# Patient Record
Sex: FEMALE | Race: WHITE | Employment: OTHER | URBAN - METROPOLITAN AREA
[De-identification: names, ages, dates, MRNs, and addresses within clinical notes are randomized per-mention and may not be internally consistent; named-entity substitution may affect disease eponyms.]

---

## 2017-03-06 ENCOUNTER — APPOINTMENT (OUTPATIENT)
Dept: GENERAL RADIOLOGY | Age: 67
DRG: 948 | End: 2017-03-06
Attending: EMERGENCY MEDICINE
Payer: MEDICARE

## 2017-03-06 ENCOUNTER — HOSPITAL ENCOUNTER (INPATIENT)
Age: 67
LOS: 2 days | Discharge: HOME HEALTH | DRG: 948 | End: 2017-03-08
Attending: EMERGENCY MEDICINE | Admitting: FAMILY MEDICINE
Payer: MEDICARE

## 2017-03-06 DIAGNOSIS — R29.898 LEFT LEG WEAKNESS: Primary | ICD-10-CM

## 2017-03-06 DIAGNOSIS — R01.1 CARDIAC MURMUR: ICD-10-CM

## 2017-03-06 PROBLEM — R53.1 WEAKNESS GENERALIZED: Status: ACTIVE | Noted: 2017-03-06

## 2017-03-06 LAB
ALBUMIN SERPL BCP-MCNC: 4 G/DL (ref 3.2–4.6)
ALBUMIN/GLOB SERPL: 1 {RATIO} (ref 1.2–3.5)
ALP SERPL-CCNC: 107 U/L (ref 50–136)
ALT SERPL-CCNC: 39 U/L (ref 12–65)
ANION GAP BLD CALC-SCNC: 9 MMOL/L (ref 7–16)
AST SERPL W P-5'-P-CCNC: 32 U/L (ref 15–37)
BASOPHILS # BLD AUTO: 0 K/UL (ref 0–0.2)
BASOPHILS # BLD: 0 % (ref 0–2)
BILIRUB SERPL-MCNC: 0.4 MG/DL (ref 0.2–1.1)
BUN SERPL-MCNC: 21 MG/DL (ref 8–23)
CALCIUM SERPL-MCNC: 11.1 MG/DL (ref 8.3–10.4)
CHLORIDE SERPL-SCNC: 99 MMOL/L (ref 98–107)
CO2 SERPL-SCNC: 31 MMOL/L (ref 21–32)
CREAT SERPL-MCNC: 1.22 MG/DL (ref 0.6–1)
DIFFERENTIAL METHOD BLD: ABNORMAL
EOSINOPHIL # BLD: 0.2 K/UL (ref 0–0.8)
EOSINOPHIL NFR BLD: 3 % (ref 0.5–7.8)
ERYTHROCYTE [DISTWIDTH] IN BLOOD BY AUTOMATED COUNT: 12.7 % (ref 11.9–14.6)
GLOBULIN SER CALC-MCNC: 3.9 G/DL (ref 2.3–3.5)
GLUCOSE SERPL-MCNC: 149 MG/DL (ref 65–100)
HCT VFR BLD AUTO: 41.9 % (ref 35.8–46.3)
HGB BLD-MCNC: 14.7 G/DL (ref 11.7–15.4)
IMM GRANULOCYTES # BLD: 0 K/UL (ref 0–0.5)
IMM GRANULOCYTES NFR BLD AUTO: 0.3 % (ref 0–5)
LYMPHOCYTES # BLD AUTO: 33 % (ref 13–44)
LYMPHOCYTES # BLD: 2.4 K/UL (ref 0.5–4.6)
MCH RBC QN AUTO: 33.1 PG (ref 26.1–32.9)
MCHC RBC AUTO-ENTMCNC: 35.1 G/DL (ref 31.4–35)
MCV RBC AUTO: 94.4 FL (ref 79.6–97.8)
MONOCYTES # BLD: 0.4 K/UL (ref 0.1–1.3)
MONOCYTES NFR BLD AUTO: 5 % (ref 4–12)
NEUTS SEG # BLD: 4.2 K/UL (ref 1.7–8.2)
NEUTS SEG NFR BLD AUTO: 59 % (ref 43–78)
PLATELET # BLD AUTO: 243 K/UL (ref 150–450)
PMV BLD AUTO: 10.1 FL (ref 10.8–14.1)
POTASSIUM SERPL-SCNC: 3.7 MMOL/L (ref 3.5–5.1)
PROT SERPL-MCNC: 7.9 G/DL (ref 6.3–8.2)
RBC # BLD AUTO: 4.44 M/UL (ref 4.05–5.25)
SODIUM SERPL-SCNC: 139 MMOL/L (ref 136–145)
TROPONIN I SERPL-MCNC: <0.02 NG/ML (ref 0.02–0.05)
WBC # BLD AUTO: 7.3 K/UL (ref 4.3–11.1)

## 2017-03-06 PROCEDURE — 85025 COMPLETE CBC W/AUTO DIFF WBC: CPT | Performed by: EMERGENCY MEDICINE

## 2017-03-06 PROCEDURE — 71020 XR CHEST PA LAT: CPT

## 2017-03-06 PROCEDURE — 93005 ELECTROCARDIOGRAM TRACING: CPT | Performed by: EMERGENCY MEDICINE

## 2017-03-06 PROCEDURE — 99284 EMERGENCY DEPT VISIT MOD MDM: CPT | Performed by: EMERGENCY MEDICINE

## 2017-03-06 PROCEDURE — 65270000029 HC RM PRIVATE

## 2017-03-06 PROCEDURE — 84484 ASSAY OF TROPONIN QUANT: CPT | Performed by: EMERGENCY MEDICINE

## 2017-03-06 PROCEDURE — 80053 COMPREHEN METABOLIC PANEL: CPT | Performed by: EMERGENCY MEDICINE

## 2017-03-07 ENCOUNTER — APPOINTMENT (OUTPATIENT)
Dept: MRI IMAGING | Age: 67
DRG: 948 | End: 2017-03-07
Attending: FAMILY MEDICINE
Payer: MEDICARE

## 2017-03-07 ENCOUNTER — APPOINTMENT (OUTPATIENT)
Dept: ULTRASOUND IMAGING | Age: 67
DRG: 948 | End: 2017-03-07
Attending: INTERNAL MEDICINE
Payer: MEDICARE

## 2017-03-07 ENCOUNTER — APPOINTMENT (OUTPATIENT)
Dept: MRI IMAGING | Age: 67
DRG: 948 | End: 2017-03-07
Attending: INTERNAL MEDICINE
Payer: MEDICARE

## 2017-03-07 LAB
ANION GAP BLD CALC-SCNC: 8 MMOL/L (ref 7–16)
ATRIAL RATE: 101 BPM
BUN SERPL-MCNC: 24 MG/DL (ref 8–23)
CALCIUM SERPL-MCNC: 10 MG/DL (ref 8.3–10.4)
CALCULATED P AXIS, ECG09: 53 DEGREES
CALCULATED R AXIS, ECG10: 78 DEGREES
CALCULATED T AXIS, ECG11: 35 DEGREES
CHLORIDE SERPL-SCNC: 102 MMOL/L (ref 98–107)
CHOLEST SERPL-MCNC: 187 MG/DL
CO2 SERPL-SCNC: 29 MMOL/L (ref 21–32)
CREAT SERPL-MCNC: 1.07 MG/DL (ref 0.6–1)
DIAGNOSIS, 93000: NORMAL
DIASTOLIC BP, ECG02: NORMAL MMHG
ERYTHROCYTE [DISTWIDTH] IN BLOOD BY AUTOMATED COUNT: 12.8 % (ref 11.9–14.6)
EST. AVERAGE GLUCOSE BLD GHB EST-MCNC: 120 MG/DL
FLUAV AG NPH QL IA: NEGATIVE
FLUBV AG NPH QL IA: NEGATIVE
GLUCOSE BLD STRIP.AUTO-MCNC: 106 MG/DL (ref 65–100)
GLUCOSE BLD STRIP.AUTO-MCNC: 161 MG/DL (ref 65–100)
GLUCOSE BLD STRIP.AUTO-MCNC: 91 MG/DL (ref 65–100)
GLUCOSE SERPL-MCNC: 115 MG/DL (ref 65–100)
HBA1C MFR BLD: 5.8 % (ref 4.8–6)
HCT VFR BLD AUTO: 41.5 % (ref 35.8–46.3)
HDLC SERPL-MCNC: 44 MG/DL (ref 40–60)
HDLC SERPL: 4.3 {RATIO}
HGB BLD-MCNC: 14.5 G/DL (ref 11.7–15.4)
LDLC SERPL CALC-MCNC: 102.8 MG/DL
LIPID PROFILE,FLP: ABNORMAL
MAGNESIUM SERPL-MCNC: 2.4 MG/DL (ref 1.8–2.4)
MCH RBC QN AUTO: 33.1 PG (ref 26.1–32.9)
MCHC RBC AUTO-ENTMCNC: 34.9 G/DL (ref 31.4–35)
MCV RBC AUTO: 94.7 FL (ref 79.6–97.8)
P-R INTERVAL, ECG05: 160 MS
PLATELET # BLD AUTO: 163 K/UL (ref 150–450)
PMV BLD AUTO: 10.7 FL (ref 10.8–14.1)
POTASSIUM SERPL-SCNC: 3.8 MMOL/L (ref 3.5–5.1)
Q-T INTERVAL, ECG07: 342 MS
QRS DURATION, ECG06: 92 MS
QTC CALCULATION (BEZET), ECG08: 443 MS
RBC # BLD AUTO: 4.38 M/UL (ref 4.05–5.25)
SODIUM SERPL-SCNC: 139 MMOL/L (ref 136–145)
SYSTOLIC BP, ECG01: NORMAL MMHG
T4 FREE SERPL-MCNC: 1.1 NG/DL (ref 0.78–1.46)
TRIGL SERPL-MCNC: 201 MG/DL (ref 35–150)
TSH SERPL DL<=0.005 MIU/L-ACNC: 2.69 UIU/ML (ref 0.36–3.74)
VENTRICULAR RATE, ECG03: 101 BPM
VLDLC SERPL CALC-MCNC: 40.2 MG/DL (ref 6–23)
WBC # BLD AUTO: 5.7 K/UL (ref 4.3–11.1)

## 2017-03-07 PROCEDURE — 84439 ASSAY OF FREE THYROXINE: CPT | Performed by: FAMILY MEDICINE

## 2017-03-07 PROCEDURE — 86580 TB INTRADERMAL TEST: CPT | Performed by: FAMILY MEDICINE

## 2017-03-07 PROCEDURE — 70553 MRI BRAIN STEM W/O & W/DYE: CPT

## 2017-03-07 PROCEDURE — 83036 HEMOGLOBIN GLYCOSYLATED A1C: CPT | Performed by: FAMILY MEDICINE

## 2017-03-07 PROCEDURE — 80061 LIPID PANEL: CPT | Performed by: FAMILY MEDICINE

## 2017-03-07 PROCEDURE — 83735 ASSAY OF MAGNESIUM: CPT | Performed by: FAMILY MEDICINE

## 2017-03-07 PROCEDURE — 84443 ASSAY THYROID STIM HORMONE: CPT | Performed by: FAMILY MEDICINE

## 2017-03-07 PROCEDURE — 84481 FREE ASSAY (FT-3): CPT | Performed by: FAMILY MEDICINE

## 2017-03-07 PROCEDURE — 93880 EXTRACRANIAL BILAT STUDY: CPT

## 2017-03-07 PROCEDURE — 74011000302 HC RX REV CODE- 302: Performed by: FAMILY MEDICINE

## 2017-03-07 PROCEDURE — 74011250637 HC RX REV CODE- 250/637: Performed by: INTERNAL MEDICINE

## 2017-03-07 PROCEDURE — 80048 BASIC METABOLIC PNL TOTAL CA: CPT | Performed by: FAMILY MEDICINE

## 2017-03-07 PROCEDURE — 74011250636 HC RX REV CODE- 250/636: Performed by: FAMILY MEDICINE

## 2017-03-07 PROCEDURE — 74011250636 HC RX REV CODE- 250/636: Performed by: INTERNAL MEDICINE

## 2017-03-07 PROCEDURE — 65660000000 HC RM CCU STEPDOWN

## 2017-03-07 PROCEDURE — 82962 GLUCOSE BLOOD TEST: CPT

## 2017-03-07 PROCEDURE — 87804 INFLUENZA ASSAY W/OPTIC: CPT | Performed by: EMERGENCY MEDICINE

## 2017-03-07 PROCEDURE — 72141 MRI NECK SPINE W/O DYE: CPT

## 2017-03-07 PROCEDURE — 97162 PT EVAL MOD COMPLEX 30 MIN: CPT

## 2017-03-07 PROCEDURE — A9577 INJ MULTIHANCE: HCPCS | Performed by: FAMILY MEDICINE

## 2017-03-07 PROCEDURE — C8929 TTE W OR WO FOL WCON,DOPPLER: HCPCS

## 2017-03-07 PROCEDURE — 74011000250 HC RX REV CODE- 250: Performed by: FAMILY MEDICINE

## 2017-03-07 PROCEDURE — 74011250637 HC RX REV CODE- 250/637: Performed by: FAMILY MEDICINE

## 2017-03-07 PROCEDURE — 92610 EVALUATE SWALLOWING FUNCTION: CPT

## 2017-03-07 PROCEDURE — 85027 COMPLETE CBC AUTOMATED: CPT | Performed by: FAMILY MEDICINE

## 2017-03-07 PROCEDURE — 70544 MR ANGIOGRAPHY HEAD W/O DYE: CPT

## 2017-03-07 PROCEDURE — 36415 COLL VENOUS BLD VENIPUNCTURE: CPT | Performed by: FAMILY MEDICINE

## 2017-03-07 RX ORDER — ACETAMINOPHEN 325 MG/1
650 TABLET ORAL
Status: DISCONTINUED | OUTPATIENT
Start: 2017-03-07 | End: 2017-03-08 | Stop reason: HOSPADM

## 2017-03-07 RX ORDER — LORAZEPAM 1 MG/1
1 TABLET ORAL
Status: COMPLETED | OUTPATIENT
Start: 2017-03-07 | End: 2017-03-07

## 2017-03-07 RX ORDER — LORAZEPAM 2 MG/ML
2 INJECTION INTRAMUSCULAR ONCE
Status: COMPLETED | OUTPATIENT
Start: 2017-03-07 | End: 2017-03-07

## 2017-03-07 RX ORDER — DEXTROSE 40 %
15 GEL (GRAM) ORAL AS NEEDED
Status: DISCONTINUED | OUTPATIENT
Start: 2017-03-07 | End: 2017-03-08 | Stop reason: HOSPADM

## 2017-03-07 RX ORDER — DEXTROSE 50 % IN WATER (D50W) INTRAVENOUS SYRINGE
25-50 AS NEEDED
Status: DISCONTINUED | OUTPATIENT
Start: 2017-03-07 | End: 2017-03-08 | Stop reason: HOSPADM

## 2017-03-07 RX ORDER — MORPHINE SULFATE 4 MG/ML
4 INJECTION, SOLUTION INTRAMUSCULAR; INTRAVENOUS
Status: DISCONTINUED | OUTPATIENT
Start: 2017-03-07 | End: 2017-03-07

## 2017-03-07 RX ORDER — LORAZEPAM 0.5 MG/1
1 TABLET ORAL
Status: ON HOLD | COMMUNITY
End: 2017-03-08

## 2017-03-07 RX ORDER — ONDANSETRON 2 MG/ML
4 INJECTION INTRAMUSCULAR; INTRAVENOUS
Status: DISCONTINUED | OUTPATIENT
Start: 2017-03-07 | End: 2017-03-07

## 2017-03-07 RX ORDER — SODIUM CHLORIDE 0.9 % (FLUSH) 0.9 %
5-10 SYRINGE (ML) INJECTION AS NEEDED
Status: DISCONTINUED | OUTPATIENT
Start: 2017-03-07 | End: 2017-03-08 | Stop reason: HOSPADM

## 2017-03-07 RX ORDER — BISACODYL 5 MG
5 TABLET, DELAYED RELEASE (ENTERIC COATED) ORAL DAILY PRN
Status: DISCONTINUED | OUTPATIENT
Start: 2017-03-07 | End: 2017-03-08 | Stop reason: HOSPADM

## 2017-03-07 RX ORDER — CLONIDINE HYDROCHLORIDE 0.1 MG/1
0.1 TABLET ORAL 2 TIMES DAILY
Status: DISCONTINUED | OUTPATIENT
Start: 2017-03-07 | End: 2017-03-08 | Stop reason: HOSPADM

## 2017-03-07 RX ORDER — ENOXAPARIN SODIUM 100 MG/ML
40 INJECTION SUBCUTANEOUS EVERY 24 HOURS
Status: DISCONTINUED | OUTPATIENT
Start: 2017-03-07 | End: 2017-03-08 | Stop reason: HOSPADM

## 2017-03-07 RX ORDER — CLOPIDOGREL BISULFATE 75 MG/1
75 TABLET ORAL DAILY
Status: DISCONTINUED | OUTPATIENT
Start: 2017-03-07 | End: 2017-03-08 | Stop reason: HOSPADM

## 2017-03-07 RX ORDER — FAMOTIDINE 20 MG/1
20 TABLET, FILM COATED ORAL EVERY 12 HOURS
Status: DISCONTINUED | OUTPATIENT
Start: 2017-03-07 | End: 2017-03-08 | Stop reason: HOSPADM

## 2017-03-07 RX ORDER — SODIUM CHLORIDE 0.9 % (FLUSH) 0.9 %
10 SYRINGE (ML) INJECTION
Status: COMPLETED | OUTPATIENT
Start: 2017-03-07 | End: 2017-03-07

## 2017-03-07 RX ORDER — AMITRIPTYLINE HYDROCHLORIDE 50 MG/1
50 TABLET, FILM COATED ORAL
Status: ON HOLD | COMMUNITY
End: 2017-03-08

## 2017-03-07 RX ORDER — HYDRALAZINE HYDROCHLORIDE 20 MG/ML
20 INJECTION INTRAMUSCULAR; INTRAVENOUS EVERY 6 HOURS
Status: DISCONTINUED | OUTPATIENT
Start: 2017-03-07 | End: 2017-03-07

## 2017-03-07 RX ORDER — LORAZEPAM 1 MG/1
TABLET ORAL
Status: ACTIVE
Start: 2017-03-07 | End: 2017-03-07

## 2017-03-07 RX ORDER — AMITRIPTYLINE HYDROCHLORIDE 25 MG/1
100 TABLET, FILM COATED ORAL
Status: DISCONTINUED | OUTPATIENT
Start: 2017-03-07 | End: 2017-03-08 | Stop reason: HOSPADM

## 2017-03-07 RX ORDER — SODIUM CHLORIDE 0.9 % (FLUSH) 0.9 %
5-10 SYRINGE (ML) INJECTION EVERY 8 HOURS
Status: DISCONTINUED | OUTPATIENT
Start: 2017-03-07 | End: 2017-03-08 | Stop reason: HOSPADM

## 2017-03-07 RX ORDER — AMITRIPTYLINE HYDROCHLORIDE 50 MG/1
TABLET, FILM COATED ORAL
Status: ACTIVE
Start: 2017-03-07 | End: 2017-03-07

## 2017-03-07 RX ORDER — ATENOLOL 50 MG/1
50 TABLET ORAL DAILY
Status: DISCONTINUED | OUTPATIENT
Start: 2017-03-07 | End: 2017-03-07

## 2017-03-07 RX ORDER — SODIUM CHLORIDE 9 MG/ML
100 INJECTION, SOLUTION INTRAVENOUS CONTINUOUS
Status: DISPENSED | OUTPATIENT
Start: 2017-03-07 | End: 2017-03-08

## 2017-03-07 RX ORDER — AMITRIPTYLINE HYDROCHLORIDE 25 MG/1
50 TABLET, FILM COATED ORAL
Status: DISCONTINUED | OUTPATIENT
Start: 2017-03-07 | End: 2017-03-07

## 2017-03-07 RX ORDER — LORAZEPAM 2 MG/ML
1 INJECTION INTRAMUSCULAR ONCE
Status: DISCONTINUED | OUTPATIENT
Start: 2017-03-07 | End: 2017-03-07

## 2017-03-07 RX ORDER — ASPIRIN 325 MG
325 TABLET ORAL DAILY
Status: DISCONTINUED | OUTPATIENT
Start: 2017-03-07 | End: 2017-03-08 | Stop reason: HOSPADM

## 2017-03-07 RX ORDER — ASPIRIN 81 MG/1
81 TABLET ORAL DAILY
Status: DISCONTINUED | OUTPATIENT
Start: 2017-03-07 | End: 2017-03-07 | Stop reason: SDUPTHER

## 2017-03-07 RX ORDER — AMITRIPTYLINE HYDROCHLORIDE 25 MG/1
75 TABLET, FILM COATED ORAL ONCE
Status: COMPLETED | OUTPATIENT
Start: 2017-03-07 | End: 2017-03-07

## 2017-03-07 RX ORDER — LISINOPRIL 20 MG/1
40 TABLET ORAL DAILY
Status: DISCONTINUED | OUTPATIENT
Start: 2017-03-07 | End: 2017-03-07

## 2017-03-07 RX ORDER — OXYCODONE AND ACETAMINOPHEN 7.5; 325 MG/1; MG/1
1 TABLET ORAL
Status: DISCONTINUED | OUTPATIENT
Start: 2017-03-07 | End: 2017-03-07

## 2017-03-07 RX ORDER — ONDANSETRON 2 MG/ML
4 INJECTION INTRAMUSCULAR; INTRAVENOUS
Status: DISCONTINUED | OUTPATIENT
Start: 2017-03-07 | End: 2017-03-08 | Stop reason: HOSPADM

## 2017-03-07 RX ORDER — CLONIDINE HYDROCHLORIDE 0.1 MG/1
0.1 TABLET ORAL
Status: DISCONTINUED | OUTPATIENT
Start: 2017-03-07 | End: 2017-03-07

## 2017-03-07 RX ADMIN — ENOXAPARIN SODIUM 40 MG: 40 INJECTION SUBCUTANEOUS at 08:19

## 2017-03-07 RX ADMIN — PERFLUTREN 1 ML: 6.52 INJECTION, SUSPENSION INTRAVENOUS at 17:11

## 2017-03-07 RX ADMIN — TUBERCULIN PURIFIED PROTEIN DERIVATIVE 5 UNITS: 5 INJECTION INTRADERMAL at 09:50

## 2017-03-07 RX ADMIN — AMITRIPTYLINE HYDROCHLORIDE 50 MG: 50 TABLET, FILM COATED ORAL at 01:12

## 2017-03-07 RX ADMIN — GADOBENATE DIMEGLUMINE 20 ML: 529 INJECTION, SOLUTION INTRAVENOUS at 14:29

## 2017-03-07 RX ADMIN — ASPIRIN 325 MG ORAL TABLET 325 MG: 325 PILL ORAL at 08:19

## 2017-03-07 RX ADMIN — CLONIDINE HYDROCHLORIDE 0.1 MG: 0.1 TABLET ORAL at 08:04

## 2017-03-07 RX ADMIN — Medication 10 ML: at 14:29

## 2017-03-07 RX ADMIN — Medication 10 ML: at 21:35

## 2017-03-07 RX ADMIN — LORAZEPAM 1 MG: 1 TABLET ORAL at 01:12

## 2017-03-07 RX ADMIN — FAMOTIDINE 20 MG: 20 TABLET ORAL at 08:04

## 2017-03-07 RX ADMIN — SODIUM CHLORIDE 100 ML/HR: 900 INJECTION, SOLUTION INTRAVENOUS at 21:34

## 2017-03-07 RX ADMIN — CLONIDINE HYDROCHLORIDE 0.1 MG: 0.1 TABLET ORAL at 17:37

## 2017-03-07 RX ADMIN — FAMOTIDINE 20 MG: 20 TABLET ORAL at 21:34

## 2017-03-07 RX ADMIN — AMITRIPTYLINE HYDROCHLORIDE 100 MG: 25 TABLET, FILM COATED ORAL at 21:34

## 2017-03-07 RX ADMIN — SODIUM CHLORIDE 100 ML/HR: 900 INJECTION, SOLUTION INTRAVENOUS at 03:27

## 2017-03-07 RX ADMIN — AMITRIPTYLINE HYDROCHLORIDE 75 MG: 25 TABLET, FILM COATED ORAL at 05:04

## 2017-03-07 RX ADMIN — LORAZEPAM 2 MG: 2 INJECTION, SOLUTION INTRAMUSCULAR; INTRAVENOUS at 12:32

## 2017-03-07 NOTE — ED TRIAGE NOTES
Patient to ed with c/o \"complete and total numbness on my left side\" which she states has been ongoing since feb 22--states she was seen at [facility] for same symptoms--patient reports today she attempted to call pcp and when she was told there where no appts available she states \"he told me to go to the ER\"--patient also reports c/o chest pain since feb 22

## 2017-03-07 NOTE — PROGRESS NOTES
Progress Note    Patient: Erica Helton MRN: [id number]  SSN: [id number]    YOB: 1950  Age: 75 y.o. Sex: female      Admit Date: 3/6/2017    LOS: 1 day     Subjective:     Seen at bedside. Stable. Workup for CVA negative. Some abnormalities in cervical spine mri reported. Will discuss case with neurosurgery     Objective:     Vitals:    03/07/17 0047 03/07/17 0227 03/07/17 0333 03/07/17 0653   BP:  (!) 204/107 (!) 187/109 (!) 196/103   Pulse: 83 100 82    Resp: 23 20     Temp:  98.8 °F (37.1 °C)     SpO2: 95% 96%     Weight:  102.3 kg (225 lb 8 oz)     Height:            Intake and Output:  Current Shift:    Last three shifts:      Physical Exam:   GENERAL: alert, cooperative, no distress, appears stated age  EYE: conjunctivae/corneas clear. PERRL, EOM's intact. Fundi benign  LYMPHATIC: Cervical, supraclavicular, and axillary nodes normal.   THROAT & NECK: normal and no erythema or exudates noted. LUNG: clear to auscultation bilaterally  HEART: regular rate and rhythm, S1, S2 normal, no murmur, click, rub or gallop  ABDOMEN: soft, non-tender. Bowel sounds normal. No masses,  no organomegaly  EXTREMITIES:  LLE pain with movement   SKIN: Normal.  NEUROLOGIC: AOx3. Gait normal. Reflexes and motor strength normal and symmetric. Cranial nerves 2-12 and sensation grossly intact. PSYCHIATRIC: non focal    Lab/Data Review:  Lab results reviewed. For significant abnormal values and values requiring intervention, see assessment and plan.          Assessment:     Principal Problem:    Weakness generalized (3/6/2017)    Active Problems:    HTN (hypertension) (10/20/2010)      Diabetes mellitus type 2, controlled ([facility] (10/20/2010)      GERD (gastroesophageal reflux disease) (10/20/2010)      Weakness of left leg (8/6/2013)      Reaction, adjustment, with anxious mood (6/6/2014)        Plan:     # MRI and MRA negative for CVA   #Carotid US unremarkable   # ECHO pending   #cervical spine MRI with some abnormalities, will discuss with neurosurgery     Signed By: Ml Hoang MD     March 7, 2017

## 2017-03-07 NOTE — H&P
HOSPITALIST H&P/CONSULT  NAME:  Yadira Hernandez   Age:  75 y.o.  :   1950   MRN:   [id number]  PCP: Mei Ovalles MD  Treatment Team: Attending Provider: Leah Mckeon MD; Primary Nurse: Renae Williamson RN    Prior     CC: Reason for admission is: worsening weakness; possible CVA    HPI:   Patient case was reviewed with the ER provider prior to seeing the patient. Patient is a 75 y.o. female who presents to the ER thinking that she has had a stroke. She has had a prior stroke affecting her left side. She has recovered much strength and states she works out with a  4 days a week for up to 2 hours. In early Feb, she felt weaker and went to [address] [place] [address], had neg w/u and sent home from ER. She now reports her weakness is worse,  She went from cane to walker, now with several falls and can't really walk with walker either. 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 She reports that this is all the same as her last stroke. ROS:  All systems have been reviewed and are negative except as stated in HPI or elsewhere. Past Medical History:   Diagnosis Date    Arthritis     Cancer ([address] -    BREAST Lt.  CVA (cerebral vascular accident) ([address] 2013    CVA (cerebral vascular accident) ([address] 2013    Diabetes ([address] 2002    on oral medicine     Endocrine disease     GERD (gastroesophageal reflux disease)     on medication    GERD (gastroesophageal reflux disease) 10/20/2010    High cholesterol     on medication    Hypertension     on medication    Inflammatory polyps of colon with rectal bleeding ([address] 2014    Lower GI bleeding 2014    Nausea & vomiting     Radiation therapy complication     Sepsis 10/20/2010    UTI (lower urinary tract infection) 10/20/2010      Past Surgical History:   Procedure Laterality Date    BREAST SURGERY PROCEDURE UNLISTED      Lt.  Breast Bx    HX BREAST LUMPECTOMY      lt breast    HX CHOLECYSTECTOMY      Lap    HX COLONOSCOPY      1 polyp removed cancerous    HX GYN      Hysterectomy    HX LYMPH NODE DISSECTION      left Breast lyp nodes removed    HX MOHS PROCEDURES      left shoulder    HX ORTHOPAEDIC      Lt. Foot surgery    HX OTHER SURGICAL      right AXILLARY NODE DISECTION    HX TUBAL LIGATION      NEUROLOGICAL PROCEDURE UNLISTED  2008    Neck surgery (C-3-4, cleaned up 5-6-7)      Social History   Substance Use Topics    Smoking status: Never Smoker    Smokeless tobacco: Never Used    Alcohol use No      Family History   Problem Relation Age of Onset    Malignant Hyperthermia Neg Hx     Pseudocholinesterase Deficiency Neg Hx     Delayed Awakening Neg Hx     Post-op Nausea/Vomiting Neg Hx     Emergence Delirium Neg Hx     Post-op Cognitive Dysfunction Neg Hx     Heart Disease Mother     Diabetes Father     Heart Disease Father     Cancer Sister      rectal that got in lymph nodes    Other Sister      Hep C    Drug Abuse Sister     Drug Abuse Brother     Drug Abuse Brother        FH Reviewed and non-contributory to admitting diagnosis    Allergies   Allergen Reactions    Actos [Pioglitazone] Other (comments)     Weakness and chest pain    Ancef [Cefazolin] Other (comments)     Blisters all over even in mouth. ....  Egg Diarrhea    Glucophage [Metformin] Other (comments)     Weakness and chest pain    Influenza Virus Vaccine, Specific Other (comments)     Flu like symptoms severe    Other Medication Rash     All tape-blister and rash    Tribenzor [Olmesartan-Amlodipin-Hcthiazid] Rash      Prior to Admission Medications   Prescriptions Last Dose Informant Patient Reported? Taking? BIOFLAV,LEMON/VIT BCOMP&C (LIPO-FLAVONOID PLUS PO)   Yes No   Sig: Take  by mouth daily. CAPSICUM, CAYENNE, PO   Yes No   Sig: Take 1 Tab by mouth daily. Ashley Prim-Linoleic-Gamolenic Ac (PRIMROSE OIL) 1,000 mg cap   Yes No   Sig: Take  by mouth daily.    MILK THISTLE PO   Yes No   Sig: Take 1 Tab by mouth daily. Omega-3-DHA-EPA-Fish Oil 1,000 (120-180) mg cap   Yes No   Sig: Take  by mouth. aspirin delayed-release 81 mg tablet   Yes No   Sig: Take  by mouth daily. atenolol (TENORMIN) 50 mg tablet   No No   Sig: Take 1 Tab by mouth daily. biotin 10 mg tab   Yes No   Sig: Take  by mouth daily. calcium-cholecalciferol, d3, (CALCIUM 600 + D) 600-125 mg-unit tab   Yes No   Sig: Take  by mouth daily. cinnamon bark (CINNAMON) 500 mg cap   Yes No   Sig: Take 2,000 mg by mouth daily. cyanocobalamin, vitamin B-12, (VITAMIN B-12) 5,000 mcg subl   Yes No   Sig: by SubLINGual route daily. garlic 4,667 mg cap   Yes No   Sig: Take  by mouth daily. lisinopril (PRINIVIL, ZESTRIL) 40 mg tablet   No No   Sig: Take 1 Tab by mouth daily. potassium 99 mg tablet   Yes No   Sig: Take 99 mg by mouth daily. vitamin c-vitamin e (CRANBERRY CONCENTRATE) cap   Yes No   Sig: Take 84 mg by mouth daily. Facility-Administered Medications: None         Objective:     Visit Vitals    BP (!) 197/92 (BP 1 Location: Left arm, BP Patient Position: At rest)    Pulse 84    Temp 98.1 °F (36.7 °C)    Resp 16    Ht 5' 4\" (1.626 m)    Wt 100.2 kg (221 lb)    SpO2 98%    BMI 37.93 kg/m2      Temp (24hrs), Av.1 °F (36.7 °C), Min:98.1 °F (36.7 °C), Max:98.1 °F (36.7 °C)    Oxygen Therapy  O2 Sat (%): 98 % (17)  Pulse via Oximetry: 84 beats per minute (17)  O2 Device: Room air (17)   Body mass index is 37.93 kg/(m^2). Physical Exam:    General:    WD and WN , No apparent distress. Head:   Normocephalic, without obvious abnormality, atraumatic. Eyes:  PERRL; EOMI  ENT:  Hearing is normal.  Oropharynx is clear with tacky mucous membranes   Resp:    Clear to auscultation bilaterally. No Wheezing or Rhonchi. Resp are even and unlabored  Heart[de-identified]  Regular rate and rhythm,  no murmur, rub or gallop. No LE edema  Abdomen:   Soft, non-tender.  Not distended. Bowel sounds normal.  hepato-splenomegaly cannot be assess due to obesity   Musc/SK: Muscle strength is weaker on left, but unknown baseline. and tone normal and appropriate for age and condition. No cyanosis. No clubbing  Skin:     Texture, turgor normal. No significant rashes or lesions. Neurologic: CN II - XII are tested and intact; Reflexes unobtainable  Psych: Alert and oriented x 4;anxious;   Judgement and insight are normal     Data Review:   Recent Results (from the past 24 hour(s))   EKG, 12 LEAD, INITIAL    Collection Time: 03/06/17  8:01 PM   Result Value Ref Range    Systolic BP  mmHg    Diastolic BP  mmHg    Ventricular Rate 101 BPM    Atrial Rate 101 BPM    P-R Interval 160 ms    QRS Duration 92 ms    Q-T Interval 342 ms    QTC Calculation (Bezet) 443 ms    Calculated P Axis 53 degrees    Calculated R Axis 78 degrees    Calculated T Axis 35 degrees    Diagnosis       !!! Poor data quality, interpretation may be adversely affected  Sinus tachycardia  Right atrial enlargement  Septal infarct , age undetermined  Possible Lateral infarct , age undetermined  Abnormal ECG  When compared with ECG of 18-DEC-2013 09:28,  Vent. rate has increased BY  38 BPM  Septal infarct is now Present  Borderline criteria for Lateral infarct are now Present     CBC WITH AUTOMATED DIFF    Collection Time: 03/06/17  8:15 PM   Result Value Ref Range    WBC 7.3 4.3 - 11.1 K/uL    RBC 4.44 4.05 - 5.25 M/uL    HGB 14.7 11.7 - 15.4 g/dL    HCT 41.9 35.8 - 46.3 %    MCV 94.4 79.6 - 97.8 FL    MCH 33.1 (H) 26.1 - 32.9 PG    MCHC 35.1 (H) 31.4 - 35.0 g/dL    RDW 12.7 11.9 - 14.6 %    PLATELET 619 009 - 879 K/uL    MPV 10.1 (L) 10.8 - 14.1 FL    DF AUTOMATED      NEUTROPHILS 59 43 - 78 %    LYMPHOCYTES 33 13 - 44 %    MONOCYTES 5 4.0 - 12.0 %    EOSINOPHILS 3 0.5 - 7.8 %    BASOPHILS 0 0.0 - 2.0 %    IMMATURE GRANULOCYTES 0.3 0.0 - 5.0 %    ABS. NEUTROPHILS 4.2 1.7 - 8.2 K/UL    ABS. LYMPHOCYTES 2.4 0.5 - 4.6 K/UL    ABS. MONOCYTES 0.4 0.1 - 1.3 K/UL    ABS. EOSINOPHILS 0.2 0.0 - 0.8 K/UL    ABS. BASOPHILS 0.0 0.0 - 0.2 K/UL    ABS. IMM. GRANS. 0.0 0.0 - 0.5 K/UL   METABOLIC PANEL, COMPREHENSIVE    Collection Time: 03/06/17  8:15 PM   Result Value Ref Range    Sodium 139 136 - 145 mmol/L    Potassium 3.7 3.5 - 5.1 mmol/L    Chloride 99 98 - 107 mmol/L    CO2 31 21 - 32 mmol/L    Anion gap 9 7 - 16 mmol/L    Glucose 149 (H) 65 - 100 mg/dL    BUN 21 8 - 23 MG/DL    Creatinine 1.22 (H) 0.6 - 1.0 MG/DL    GFR est AA 56 (L) >60 ml/min/1.73m2    GFR est non-AA 47 (L) >60 ml/min/1.73m2    Calcium 11.1 (H) 8.3 - 10.4 MG/DL    Bilirubin, total 0.4 0.2 - 1.1 MG/DL    ALT (SGPT) 39 12 - 65 U/L    AST (SGOT) 32 15 - 37 U/L    Alk. phosphatase 107 50 - 136 U/L    Protein, total 7.9 6.3 - 8.2 g/dL    Albumin 4.0 3.2 - 4.6 g/dL    Globulin 3.9 (H) 2.3 - 3.5 g/dL    A-G Ratio 1.0 (L) 1.2 - 3.5     TROPONIN I    Collection Time: 03/06/17  8:15 PM   Result Value Ref Range    Troponin-I, Qt. <0.02 (L) 0.02 - 0.05 NG/ML     CXR Results  (Last 48 hours)               03/06/17 2030  XR CHEST PA LAT Final result    Impression:  IMPRESSION:  STABLE MILD CARDIOMEGALY WITH NO ACUTE CARDIOPULMONARY DISEASE   IDENTIFIED. Narrative:  TWO-VIEW CHEST:       CLINICAL HISTORY: CHEST pain and palpitations. COMPARISON:  December 18, 2013. FINDINGS: PA and lateral chest images demonstrate no acute pneumonic infiltrate   or significant pleural fluid collection. The heart remains mildly enlarged   without evidence of congestive heart failure or pneumothorax. The bony thorax   appears intact on these views. There are overlying radiopaque support devices. CT Results  (Last 48 hours)    None          Assessment and Plan:      Active Hospital Problems    Diagnosis Date Noted    Weakness generalized 03/06/2017    Reaction, adjustment, with anxious mood 06/06/2014    Weakness of left leg 08/06/2013    HTN (hypertension) 10/20/2010    [facility] mellitus [facility] 10/20/2010    GERD (gastroesophageal reflux disease) 10/20/2010         · PLAN   · CVA w/u  With PT/OT eval  · Cont appropriate home meds (see MAR)  · Control symptoms (pain, n/v, fever, etc)  · Monitor appropriate labs   · Plans discussed with patient and/or caregiver; questions answered.     Med records reviewed if applicable; findings:     Critical care time if applicable:      Signed By: Jing Bobo MD     March 6, 2017

## 2017-03-07 NOTE — PROGRESS NOTES
Notified Dr Ajit Zhou of elevated blood pressure. Orders given. 0400 Patient states she is allergic to: Hydralazine, Percocet, Atenolol, Lisinopril and that it shouldn't be on prior to admission list. Instructed patient to call  to get list and have him read but didn't want to wake him and states he wont know how to read it. States she will have him bring it. [id number] Call placed to Dr Ajit Zhou regarding Elavil dose and allergies to blood pressure med. Orders given. 0510 Patient states she is allergic to Clonidine. Elavil 75mg once time dose given as ordered. Patient has had a BM and voided this shift. States the only thing that helps \"pain\" or the discomfort of left side is the Elavil.

## 2017-03-07 NOTE — ED PROVIDER NOTES
HPI Comments: 42-year-old white female who reports a history of stroke in 2013 presents with weakness and pain involving the entire left side of her body from her head to her foot. She states that she usually can walk only using a cane. Over the past 2 weeks she has had to use a walker and has had multiple falls. She does report that when the symptoms began in early February she went to [facility] and had a head CT performed. She was told to have no acute changes and she was discharged home. She states that these symptoms are identical to what she had in 2013 with her stroke. Patient is a 75 y.o. female presenting with numbness and chest pain. The history is provided by the patient. Numbness   Associated symptoms include chest pain. Pertinent negatives include no shortness of breath, no vomiting and no nausea. Chest Pain (Angina)    Associated symptoms include back pain and numbness. Pertinent negatives include no abdominal pain, no cough, no fever, no nausea, no shortness of breath and no vomiting. Past Medical History:   Diagnosis Date    Arthritis     Cancer ([address] 1-2009    BREAST Lt.  CVA (cerebral vascular accident) ([address] 8/8/2013    CVA (cerebral vascular accident) ([address] 8/8/2013    Diabetes ([address] 2002    on oral medicine     Endocrine disease     GERD (gastroesophageal reflux disease)     on medication    GERD (gastroesophageal reflux disease) 10/20/2010    High cholesterol     on medication    Hypertension     on medication    Inflammatory polyps of colon with rectal bleeding ([address] 6/6/2014    Lower GI bleeding 1/8/2014    Nausea & vomiting     Radiation therapy complication     Sepsis 10/20/2010    UTI (lower urinary tract infection) 10/20/2010       Past Surgical History:   Procedure Laterality Date    BREAST SURGERY PROCEDURE UNLISTED      Lt.  Breast Bx    HX BREAST LUMPECTOMY      lt breast    HX CHOLECYSTECTOMY      Lap    HX COLONOSCOPY      1 polyp removed cancerous    HX GYN      Hysterectomy    HX LYMPH NODE DISSECTION      left Breast lyp nodes removed    HX MOHS PROCEDURES      left shoulder    HX ORTHOPAEDIC      Lt. Foot surgery    HX OTHER SURGICAL      right AXILLARY NODE DISECTION    HX TUBAL LIGATION      NEUROLOGICAL PROCEDURE UNLISTED  2008    Neck surgery (C-3-4, cleaned up 5-6-7)         Family History:   Problem Relation Age of Onset    Malignant Hyperthermia Neg Hx     Pseudocholinesterase Deficiency Neg Hx     Delayed Awakening Neg Hx     Post-op Nausea/Vomiting Neg Hx     Emergence Delirium Neg Hx     Post-op Cognitive Dysfunction Neg Hx     Heart Disease Mother     Diabetes Father     Heart Disease Father     Cancer Sister      rectal that got in lymph nodes    Other Sister      Hep C    Drug Abuse Sister     Drug Abuse Brother     Drug Abuse Brother        Social History     Social History    Marital status:      Spouse name: N/A    Number of children: N/A    Years of education: N/A     Occupational History    Not on file. Social History Main Topics    Smoking status: Never Smoker    Smokeless tobacco: Never Used    Alcohol use No    Drug use: No    Sexual activity: No     Other Topics Concern    Not on file     Social History Narrative         ALLERGIES: Actos [pioglitazone]; Ancef [cefazolin]; Egg; Glucophage [metformin]; Influenza virus vaccine, specific; Other medication; and Tribenzor [olmesartan-amlodipin-hcthiazid]    Review of Systems   Constitutional: Negative for fever. HENT: Negative for congestion. Respiratory: Negative for cough and shortness of breath. Cardiovascular: Positive for chest pain. Gastrointestinal: Negative for abdominal pain, nausea and vomiting. Genitourinary: Negative for dysuria. Musculoskeletal: Positive for back pain. Negative for neck pain. Skin: Negative for rash. Neurological: Positive for numbness.        Vitals:    03/06/17 2001   BP: 121/88   Pulse: 100 Resp: 17   Temp: 98.1 °F (36.7 °C)   SpO2: 98%   Weight: 100.2 kg (221 lb)   Height: 5' 4\" (1.626 m)            Physical Exam   Constitutional: She is oriented to person, place, and time. She appears well-developed and well-nourished. No distress. HENT:   Head: Normocephalic and atraumatic. Mouth/Throat: Oropharynx is clear and moist.   Eyes: Conjunctivae and EOM are normal. Pupils are equal, round, and reactive to light. Neck: Normal range of motion. Cardiovascular: Normal rate and regular rhythm. Murmur heard. Pulmonary/Chest: Effort normal and breath sounds normal. She has no wheezes. Abdominal: Soft. She exhibits no distension. There is no tenderness. Musculoskeletal: Normal range of motion. She exhibits no edema. Neurological: She is alert and oriented to person, place, and time. She has normal reflexes. No cranial nerve deficit. No appreciable difference in strength from left to the right however patient is unable to bear weight on her left leg. Skin: Skin is warm and dry. Psychiatric: She has a normal mood and affect. Nursing note and vitals reviewed. MDM  Number of Diagnoses or Management Options  Diagnosis management comments: Blood work is unremarkable. EKG shows no acute ST changes. I did review the head CT from [facility] from February 22. As her symptoms are unchanged from then did not feel it necessary to repeat it at this time. Will discuss with hospitalist for admission for MRI and patient is noted to have a new cardiac murmur and may warrant cardiac echo.        Amount and/or Complexity of Data Reviewed  Clinical lab tests: ordered and reviewed  Tests in the radiology section of CPT®: ordered and reviewed  Discuss the patient with other providers: yes    Risk of Complications, Morbidity, and/or Mortality  Presenting problems: moderate  Diagnostic procedures: moderate  Management options: moderate      ED Course       Procedures

## 2017-03-07 NOTE — PROGRESS NOTES
STG: Pt will tolerate regular textures/thin liquids without signs/sx of aspiration with 100% accuracy  STG: Pt will participate with full cognitive-linguistic assessment x1  LTG: Pt will tolerate the least restrictive diet at discharge without respiratory compromise    Speech language pathology: bedside swallow note: Initial Assessment    NAME/AGE/GENDER: Kaylynn Linares is a 75 y.o. female  DATE: 3/7/2017  PRIMARY DIAGNOSIS: Weakness generalized       ICD-10: Treatment Diagnosis: dysphagia; unspecified R13.10  INTERDISCIPLINARY COLLABORATION: Registered Nurse  PRECAUTIONS/ALLERGIES: Actos [pioglitazone]; Ancef [cefazolin]; Egg; Glucophage [metformin]; Influenza virus vaccine, specific; Other medication; and Tribenzor [olmesartan-amlodipin-hcthiazid]ASSESSMENT:Based on the objective data described below, Ms. Lenore Manuel presents with a swallow grossly within functional limits. Patient reports left sided weakness and tingling sensation on left side of face. History of CVA in 2013 but with good recovery and patient reporting she has been fairly active since that time. Oral motor exam is normal with no perceptual dysarthria. Tolerated thin liquids via the straw, mixed, and solids without overt signs/sx of aspiration. Normal mastication time. Patient denies dysphagia with the exception of difficulty with rice. Will reflect in diet orders. Patient is fully oriented and recalls recent events but reports some mild difficulty with word finding lately. She lives at home with her . Recommend continue cardiac regular diet. Will follow for cognitive-linguistic assessment pending MRI results. Patient will benefit from skilled intervention to address the below impairments. ?????? ? ? This section established at most recent assessment??????????  PROBLEM LIST (Impairments causing functional limitations):  1.  Cognitive-linguistic deficits  REHABILITATION POTENTIAL FOR STATED GOALS: Good  PLAN OF CARE:   Patient will benefit from skilled intervention to address the following impairments. RECOMMENDATIONS AND PLANNED INTERVENTIONS (Benefits and precautions of therapy have been discussed with the patient.):  · continue prescribed diet  MEDICATIONS:  · With liquid  COMPENSATORY STRATEGIES/MODIFICATIONS INCLUDING:  · Small sips and bites  OTHER RECOMMENDATIONS (including follow up treatment recommendations): · Family training/education  · Patient education  · diet tolerance   · Cognitive assessment  RECOMMENDED DIET MODIFICATIONS DISCUSSED WITH:  · Patient  FREQUENCY/DURATION: Continue to follow patient 3x a week or until short term goals are met to address above goals. RECOMMENDED REHABILITATION/EQUIPMENT: (at time of discharge pending progress):   to be determined. SUBJECTIVE:   Pleasant and cooperative. History of Present Injury/Illness: Ms. Jc Rodrigues  has a past medical history of Arthritis; Cancer [facility]) (1-2009); CVA (cerebral vascular accident) ([facility] [address] (8/8/2013); CVA (cerebral vascular accident) ([address] (8/8/2013); Diabetes ([facility] [address] (2002); Endocrine disease; GERD (gastroesophageal reflux disease); GERD (gastroesophageal reflux disease) (10/20/2010); High cholesterol; Hypertension; Inflammatory polyps of colon with rectal bleeding ([address] (6/6/2014); Lower GI bleeding (1/8/2014); Nausea & vomiting; Radiation therapy complication; Sepsis (10/20/2010); and UTI (lower urinary tract infection) (10/20/2010). She also has no past medical history of Coagulation defects; COPD; DEMENTIA; Difficult intubation; Heart failure ([address] Malignant hyperthermia due to anesthesia; Other ill-defined conditions(799.89); Pseudocholinesterase deficiency; Psychiatric disorder; Sleep disorder; Unspecified adverse effect of anesthesia; or Unspecified sleep apnea.   She also  has a past surgical history that includes other surgical; gyn; cholecystectomy; breast surgery procedure unlisted (); neurological procedure unlisted (2008); orthopaedic; mohs procedure; lymph node dissection; tubal ligation; colonoscopy; and breast lumpectomy. Present Symptoms: left sided weakness  Pain Intensity 1: 0  Pain Location 1: Hip, Leg, Back  Pain Orientation 1: Left  Pain Intervention(s) 1: Emotional support, Repositioned  Current Medications:   No current facility-administered medications on file prior to encounter. Current Outpatient Prescriptions on File Prior to Encounter   Medication Sig Dispense Refill    calcium-cholecalciferol, d3, (CALCIUM 600 + D) 600-125 mg-unit tab Take  by mouth daily.  atenolol (TENORMIN) 50 mg tablet Take 1 Tab by mouth daily. 30 Tab 5    Omega-3-DHA-EPA-Fish Oil 1,000 (120-180) mg cap Take  by mouth.  cinnamon bark (CINNAMON) 500 mg cap Take 2,000 mg by mouth daily.  garlic 1,791 mg cap Take  by mouth daily.  vitamin c-vitamin e (CRANBERRY CONCENTRATE) cap Take 84 mg by mouth daily.  CAPSICUM, CAYENNE, PO Take 1 Tab by mouth daily.  lisinopril (PRINIVIL, ZESTRIL) 40 mg tablet Take 1 Tab by mouth daily. 30 Tab 5    aspirin delayed-release 81 mg tablet Take  by mouth daily.  MILK THISTLE PO Take 1 Tab by mouth daily.  potassium 99 mg tablet Take 99 mg by mouth daily.  Ashley Prim-Linoleic-Gamolenic Ac (PRIMROSE OIL) 1,000 mg cap Take  by mouth daily.  BIOFLAV,LEMON/VIT BCOMP&C (LIPO-FLAVONOID PLUS PO) Take  by mouth daily.  cyanocobalamin, vitamin B-12, (VITAMIN B-12) 5,000 mcg subl by SubLINGual route daily.  biotin 10 mg tab Take  by mouth daily.        Current Dietary Status: cardiac regular       History of reflux:  no  Social History/Home Situation: home with   Home Environment: Private residence  # Steps to Enter: 1  One/Two Story Residence: One story  Living Alone: No  Support Systems: Spouse/Significant Other/Partner  Patient Expects to be Discharged to[de-identified] Private residence  Current DME Used/Available at Home: Dolores Santos 6, straight  OBJECTIVE:   Respiratory Status:  Room air     CXR Results:STABLE MILD CARDIOMEGALY WITH NO ACUTE CARDIOPULMONARY DISEASE  MRI/CT Results: MRI pending  Oral Motor Structure/Speech:  Oral-Motor Structure/Motor Speech  Labial: No impairment  Dentition: Natural  Oral Hygiene: adequate  Lingual: No impairment    Cognitive and Communication Status:  Neurologic State: Alert  Orientation Level: Oriented X4  Cognition: Appropriate for age attention/concentration  Perception: Appears intact  Perseveration: No perseveration noted  Safety/Judgement: Awareness of environment    BEDSIDE SWALLOW EVALUATION  Oral Assessment:  Oral Assessment  Labial: No impairment  Dentition: Natural  Oral Hygiene: adequate  Lingual: No impairment  P.O. Trials:  Patient Position: upright in bed    The patient was given tsp to straw amounts of the following:   Consistency Presented: Solid; Thin liquid;Mixed consistency  How Presented: Spoon;Straw;Self-fed/presented    ORAL PHASE:  Bolus Acceptance: No impairment  Bolus Formation/Control: No impairment  Propulsion: No impairment     Oral Residue: None    PHARYNGEAL PHASE:  Initiation of Swallow: No impairment     Aspiration Signs/Symptoms: None  Vocal Quality: No impairment     Effective Modifications: None     Pharyngeal Phase Characteristics: No impairment, issues, or problems     OTHER OBSERVATIONS:  Rate/bite size: WNL   Endurance:   WNL     Tool Used: Dysphagia Outcome and Severity Scale (KALPESH)    Score Comments   Normal Diet  [x] 7 With no strategies or extra time needed   Functional Swallow  [] 6 May have mild oral or pharyngeal delay       Mild Dysphagia    [] 5 Which may require one diet consistency restricted (those who demonstrate penetration which is entirely cleared on MBS would be included)   Mild-Moderate Dysphagia  [] 4 With 1-2 diet consistencies restricted       Moderate Dysphagia  [] 3 With 2 or more diet consistencies restricted       Moderately Severe Dysphagia  [] 2 With partial PO strategies (trials with ST only)       Severe Dysphagia  [] 1 With inability to tolerate any PO safely          Score:  Initial: 6 Most Recent: X (Date: -- )   Interpretation of Tool: The Dysphagia Outcome and Severity Scale (KALPESH) is a simple, easy-to-use, 7-point scale developed to systematically rate the functional severity of dysphagia based on objective assessment and make recommendations for diet level, independence level, and type of nutrition. Score 7 6 5 4 3 2 1   Modifier CH CI CJ CK CL CM CN   ? Swallowing:     - CURRENT STATUS: CH - 0% impaired, limited or restricted    - GOAL STATUS:  CH - 0% impaired, limited or restricted    - D/C STATUS:  CH - 0% impaired, limited or restricted  [place] / Product Type: Medicare /     TREATMENT:    (In addition to Assessment/Re-Assessment sessions the following treatments were rendered)  Assessment/Reassessment only, no treatment provided today    __________________________________________________________________________________________________  Safety:   After treatment position/precautions:  · Upright in Bed  Progression/Medical Necessity:   · Skilled intervention continues to be required due to unable to attend/participate in therapy as expected. Compliance with Program/Exercises: Will assess as treatment progresses. Reason for Continuation of Services/Other Comments:  · Patient continues to require skilled intervention due to patient unable to attend/participate in therapy as expected. Recommendations/Intent for next treatment session: \"Treatment next visit will focus on cognitive-linguistic assessment\".   Total Treatment Duration:  Time In: 9399  Time Out: [address] [place] [address] MS, SLP

## 2017-03-07 NOTE — PROGRESS NOTES
Problem: Mobility Impaired (Adult and Pediatric)  Goal: *Acute Goals and Plan of Care (Insert Text)  1. Ms. Karean Pickett will perform supine to sit and sit to supine with supervision in 7 days. 2. Ms. Karena Pickett will perform sit to stand and bed to chair with supervision in 7 days. 3. Ms. Karena Pickett will perform gait with least restrictive device 200 ft with supervision in 7 days. 4. Ms. Karena Pickett will perform therex x 25 reps active range of motion in supine and sitting in 7 days. PHYSICAL THERAPY: INITIAL ASSESSMENT 3/7/2017  INPATIENT: Hospital Day: 2  [place] / Product Type: Medicare /      NAME/AGE/GENDER: Karron Kussmaul is a 75 y.o. female        PRIMARY DIAGNOSIS: Weakness generalized Weakness generalized Weakness generalized        ICD-10: Treatment Diagnosis:       · Generalized Muscle Weakness (M62.81)  · Difficulty in walking, Not elsewhere classified (R26.2)   Precaution/Allergies:  Actos [pioglitazone]; Ancef [cefazolin]; Egg; Glucophage [metformin]; Influenza virus vaccine, specific; Other medication; and Tribenzor [olmesartan-amlodipin-hcthiazid]       ASSESSMENT:      Ms. Karena Pickett presents with decreased mobility and decreased gait complicated what appears to be related to pain in her left hip and back that per her description shoots down her leg. She had a stroke 4 years ago and since that time has had numbness on the left side. She had been to rehab and was doing really good. She states that recently she is getting weaker and weaker. She has some underlying anxiety issues and is on a lot of medication. She reports falls at night. Question related to the meds that she takes. Do not understand what the pain in her back is from. Currently she requires mod assist for supine to sit. Sit to stand with contact guard to min assist.  She barely puts any weight down on her left leg and takes very short steps.   Today sidesteps as leaving for MRI so did not get her to the chair. Its hard to get a really accurate picture of what she was doing at home and how she is now. When discharge was discussed rehab came up but she wasn't sure. Her  works during the day. She states \"My  wants to know what is wrong.'  Ms. Tanisha Padron is appropriate for skilled PT to maximize her rehab potential.        This section established at most recent assessment   PROBLEM LIST (Impairments causing functional limitations):  1. Decreased Strength  2. Decreased Transfer Abilities  3. Decreased Ambulation Ability/Technique  4. Increased Pain  5. Decreased Activity Tolerance    INTERVENTIONS PLANNED: (Benefits and precautions of physical therapy have been discussed with the patient.)  1. Bed Mobility  2. Gait Training  3. Therapeutic Activites  4. Therapeutic Exercise/Strengthening  5. Transfer Training  6. Group Therapy      TREATMENT PLAN: Frequency/Duration: 3 times a week for duration of hospital stay  Rehabilitation Potential For Stated Goals: GOOD      RECOMMENDED REHABILITATION/EQUIPMENT: (at time of discharge pending progress): Continue Skilled Therapy. HISTORY:   History of Present Injury/Illness (Reason for Referral):  Patient is a 75 y.o. female who presents to the ER thinking that she has had a stroke. She has had a prior stroke affecting her left side. She has recovered much strength and states she works out with a  4 days a week for up to 2 hours. In early Feb, she felt weaker and went to [facility], had neg w/u and sent home from ER. She now reports her weakness is worse, She went from cane to walker, now with several falls and can't really walk with walker either. 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 She reports that this is all the same as her last stroke. Past Medical History/Comorbidities:   Ms. Tanisha Padron  has a past medical history of Arthritis;  Cancer [facility]) (1-2009); CVA (cerebral vascular accident) ([facility] [address] (8/8/2013); CVA (cerebral vascular accident) ([facility] [address] (8/8/2013); Diabetes ([facility] [address] (2002); Endocrine disease; GERD (gastroesophageal reflux disease); GERD (gastroesophageal reflux disease) (10/20/2010); High cholesterol; Hypertension; Inflammatory polyps of colon with rectal bleeding ([facility] [address] (6/6/2014); Lower GI bleeding (1/8/2014); Nausea & vomiting; Radiation therapy complication; Sepsis (10/20/2010); and UTI (lower urinary tract infection) (10/20/2010). She also has no past medical history of Coagulation defects; COPD; DEMENTIA; Difficult intubation; Heart failure ([facility] [address] Malignant hyperthermia due to anesthesia; Other ill-defined conditions(799.89); Pseudocholinesterase deficiency; Psychiatric disorder; Sleep disorder; Unspecified adverse effect of anesthesia; or Unspecified sleep apnea. Ms. Nancy Robert  has a past surgical history that includes other surgical; gyn; cholecystectomy; breast surgery procedure unlisted (); neurological procedure unlisted (2008); orthopaedic; mohs procedure; lymph node dissection; tubal ligation; colonoscopy; and breast lumpectomy. Social History/Living Environment:   Home Environment: Private residence  # Steps to Enter: 1  One/Two Story Residence: One story  Living Alone: No  Support Systems: Spouse/Significant Other/Partner  Patient Expects to be Discharged to[de-identified] Private residence  Current DME Used/Available at Home: Walker, rolling, Cane, straight  Prior Level of Function/Work/Activity:  Independent, cane, to walker most recently.  works during the day. Personal Factors:          Age:  75 y.o. Coping Style:  anxiety        Past/Current Experience:   Old CVA        Falls   Number of Personal Factors/Comorbidities that affect the Plan of Care: 3+: HIGH COMPLEXITY   EXAMINATION:   Most Recent Physical Functioning:   Gross Assessment:  AROM: Generally decreased, functional (limited by pain on left lower extremity.)  Strength: Generally decreased, functional (limited by pain left lower extremity,5/5 DF)  Sensation: Impaired (left side of her body)               Posture:     Balance:  Sitting: Intact  Standing: Impaired  Standing - Static: Fair  Standing - Dynamic : Fair Bed Mobility:  Rolling: Minimum assistance; Moderate assistance;Assist x1  Supine to Sit: Minimum assistance; Moderate assistance;Assist x1 (she says she pulls on a dressor to get out of bed at home.)  Sit to Supine: Moderate assistance;Assist x1  Wheelchair Mobility:     Transfers:  Sit to Stand: Contact guard assistance;Minimum assistance  Stand to Sit: Minimum assistance;Contact guard assistance  Gait:     Base of Support: Narrowed  Step Length: Right shortened  Distance (ft): 1 Feet (ft)  Assistive Device: Walker, rolling  Ambulation - Level of Assistance: Contact guard assistance;Minimal assistance  Interventions: Safety awareness training; Tactile cues; Verbal cues       Body Structures Involved:  1. Muscles Body Functions Affected:  1. Movement Related Activities and Participation Affected:  1. Mobility  2. Domestic Life   Number of elements that affect the Plan of Care: 4+: HIGH COMPLEXITY   CLINICAL PRESENTATION:   Presentation: Evolving clinical presentation with changing clinical characteristics: MODERATE COMPLEXITY   CLINICAL DECISION MAKIN [facility] Inpatient Short Form  How much difficulty does the patient currently have. .. Unable A Lot A Little None   1. Turning over in bed (including adjusting bedclothes, sheets and blankets)? [ ] 1   [X] 2   [ ] 3   [ ] 4   2. Sitting down on and standing up from a chair with arms ( e.g., wheelchair, bedside commode, etc.)   [ ] 1   [X] 2   [ ] 3   [ ] 4   3. Moving from lying on back to sitting on the side of the bed? [ ] 1   [X] 2   [ ] 3   [ ] 4   How much help from another person does the patient currently need. .. Total A Lot A Little None   4. Moving to and from a bed to a chair (including a wheelchair)?    [ ] 1   [ ] 2   [X] 3   [ ] 4   5. Need to walk in hospital room? [ ] 1   [X] 2   [ ] 3   [ ] 4   6. Climbing 3-5 steps with a railing? [ ] 1   [X] 2   [ ] 3   [ ] 4   © 2007, Trustees of [address] [place] [address] [address] [address], under license to [employer]. All rights reserved    Score:  Initial: 13 Most Recent: X (Date: -- )     Interpretation of Tool:  Represents activities that are increasingly more difficult (i.e. Bed mobility, Transfers, Gait). Score 24 23 22-20 19-15 14-10 9-7 6       Modifier CH CI CJ CK CL CM CN         · Mobility - Walking and Moving Around:               - CURRENT STATUS:    CL - 60%-79% impaired, limited or restricted               - GOAL STATUS:           CL - 60%-79% impaired, limited or restricted               - D/C STATUS:                       ---------------To be determined---------------  [place] / Product Type: Medicare /       Medical Necessity:     · Patient is expected to demonstrate progress in functional technique to decrease assistance required with mobility and gait. Reason for Services/Other Comments:  · Patient continues to require present interventions due to patient's inability to function at baseline. .   Use of outcome tool(s) and clinical judgement create a POC that gives a: Questionable prediction of patient's progress: MODERATE COMPLEXITY                 TREATMENT:   (In addition to Assessment/Re-Assessment sessions the following treatments were rendered)   Pre-treatment Symptoms/Complaints:  Pain   Pain: Initial:   Pain Intensity 1: 5  Pain Location 1: Hip, Leg, Back  Pain Orientation 1: Left  Pain Intervention(s) 1: Emotional support, Repositioned  Post Session:  5/10      Assessment/Reassessment only, no treatment provided today     Braces/Orthotics/Lines/Etc:   · IV  · O2 Device: Room air  Treatment/Session Assessment:    · Response to Treatment:  good  · Interdisciplinary Collaboration:  · Physical Therapist  · Registered Nurse  · After treatment position/precautions:  · Call light within reach  · RN notified  · Compliance with Program/Exercises: Will assess as treatment progresses. · Recommendations/Intent for next treatment session: \"Next visit will focus on advancements to more challenging activities and reduction in assistance provided\".   Total Treatment Duration:  PT Patient Time In/Time Out  Time In: 1045  Time Out: 1110     Suze De Leon, PT

## 2017-03-07 NOTE — PROGRESS NOTES
3/7/2017  Attempted to see patient for OT evaluation but she is off the floor at the moment. Will re-attempt as time permits.    Thank you,  Isma Watson, OT

## 2017-03-07 NOTE — PROGRESS NOTES
END OF SHIFT NOTE:    Intake/Output      Voiding: YES  Catheter: NO  Drain:              Stool:  1 occurrences. Stool Assessment  Stool Color: Brown (03/07/17 0451)  Stool Appearance: Loose (03/07/17 0451)  Stool Amount: Medium (03/07/17 0451)  Stool Source/Status: Rectum (03/07/17 0451)    Emesis:  0 occurrences. VITAL SIGNS  Patient Vitals for the past 12 hrs:   Temp Pulse Resp BP SpO2   03/07/17 0804 - - - (!) 237/119 -   03/07/17 0653 - - - (!) 196/103 -   03/07/17 0333 - 82 - (!) 187/109 -   03/07/17 0227 98.8 °F (37.1 °C) 100 20 (!) 204/107 96 %   03/07/17 0047 - 83 23 - 95 %   03/07/17 0046 - 83 16 193/88 95 %   03/06/17 2259 - - - - 98 %   03/06/17 2256 - 84 - - 96 %   03/06/17 2254 - 84 16 (!) 197/92 98 %       Pain Assessment  Pain 1  Pain Scale 1: Numeric (0 - 10) (03/07/17 0329)  Pain Intensity 1: 5 (03/07/17 0329)  Patient Stated Pain Goal: 0 (03/07/17 0329)  Pain Location 1: Hip;Leg (03/07/17 0259)  Pain Orientation 1: Left (03/07/17 0259)  Pain Description 1: Aching; Throbbing (03/07/17 0259)  Pain Intervention(s) 1: Medication (see MAR) (patient refused percocet, states pain med doesnt help) (03/07/17 0259)    Ambulating  No    Additional Information: 2 man assist for [facility]. Continues to have tingling/numbness in fingers and toes. States \" has had this since stroke in 2013 but it had been getting better\" until recently feeling so weak and falling at home. Oncoming nurse Destini Chamberlain made aware of elevated BP and struggling to find med she isn't allergic to. Brittnee SHAFFER this morning also made aware and med changes we ordered. No c/o NV, SOB. IVF's continue without problem. Shift report given to oncoming nurse at the bedside.     Meena Yao, RN

## 2017-03-07 NOTE — MED STUDENT NOTES
*ATTENTION:  This note has been created by a medical student for educational purposes only. Please do not refer to the content of this note for clinical decision-making, billing, or other purposes. Please see attending physicians note to obtain clinical information on this patient. *        Progress Note  Patient: Walt Brady MRN: [id number] SSN: [id number]    YOB: 1950 Age: 75 y.o. Sex: female      Subjective:  Ms. Georgie Quinones is a 66yo  woman admitted 3/6 for increased LLE weakness. She has been exercising 4d/wk doing water therapy at the [facility], but has ceased d/t increased pain. She has a history of thalamic CVA in 2013 with residual L sided UE/LE weakness, but this is a departure from her baseline. Pertinent medical history from her PCP of [address] syndrome per Dr. Rogers Arauz as a result of thalamic CVA in 2013. [address] - Cervical C3-4 2008, cholecystectomy, hysterectomy, lumpectomy, MOHS, L shoulder arthroscopy  PMH - Breast CA L, CVA 2013, DM2, GERD, HLD, HTN, Lower GI bleed 2014  Meds - correct per [facility]  Allergies - pioglitazone chest pain/weakness, egg, cefazolin blisters, metformin weakness/chest pain, tape, olmesartan/amlodipine/HCTZ  SH - nonsmoker, no etoh, no illicit drugs    ROS:    Gen:  Denies fevers, chills, weight loss. Respiratory:  Denies dyspnea, cough  CV:  Denies chest pain and palpitations  GI:  Denies nausea, vomiting, diarrhea, melena, hematochezia  :  Denies dysuria, hematuria, and frequency  MSK:  Endorses LLE weakness with numbness and tingling. Endocrine:  Denies polyuria, polydipsia.   Psych:  Endorses anxiety    Physical Exam:    Patient Vitals for the past 24 hrs:   BP Temp Pulse Resp SpO2 Height Weight   03/07/17 1733 - 98.5 °F (36.9 °C) (!) 104 20 95 % - -   03/07/17 1200 164/86 98.3 °F (36.8 °C) (!) 103 20 - - -   03/07/17 0900 - - - - - 5' 4\" (1.626 m) 102.3 kg (225 lb 8 oz)   03/07/17 0800 (!) 222/137 97.8 °F (36.6 °C) (!) 162 20 94 % - - 03/07/17 0653 (!) 196/103 - - - - - -   03/07/17 0333 (!) 187/109 - 82 - - - -   03/07/17 0227 (!) 204/107 98.8 °F (37.1 °C) 100 20 96 % - 102.3 kg (225 lb 8 oz)   03/07/17 0047 - - 83 23 95 % - -   03/07/17 0046 193/88 - 83 16 95 % - -   03/06/17 2259 - - - - 98 % - -   03/06/17 2256 - - 84 - 96 % - -   03/06/17 2254 (!) 197/92 - 84 16 98 % - -   03/06/17 2001 121/88 98.1 °F (36.7 °C) 100 17 98 % 5' 4\" (1.626 m) 100.2 kg (221 lb)     General:  Obese woman in NAD, AAOx3  CV:  III/VI systolic ejection murmur best heard at LUSB, radiates b/l to carotids. No rubs or gallops. 2+ b/l dorsalis pedis  Lungs:  CTAB. No wheeze, rales or rhonchi. Abdomen:  Soft, nontender, nondistended. +BSx4. No palpable hepatosplenomegaly. MSK:  + L SIJ pain with palpation. Strength 5/5 RLE, 3/5 hip flexion LLE. 2+ b/l patellar reflex. Decreased sensation L4-S1 LLE.  + L SLR.  - Bragards LLE.  + Crossed leg raise. - Panda's test.    Neuro:  CN II-XII GI.  EOMI. PERRLA. No dysphasia. No facial droop. Uvula midline with normal palatal elevation. Normal finger-nose test.      Recent Labs      03/07/17 0610 03/06/17 2015   WBC  5.7  7.3   HGB  14.5  14.7   HCT  41.5  41.9   PLT  163  243     Recent Labs      03/07/17 0610  03/06/17 2015   NA  139  139   K  3.8  3.7   CL  102  99   CO2  29  31   BUN  24*  21   CREA  1.07*  1.22*   MG  2.4   --    SGOT   --   32   MRI - 3/7/17  1. No evidence of acute infarction. 2. Findings most compatible with moderate chronic small vessel ischemic changes  and remote bilateral thalamic lacunar infarctions. 3. Multifocal areas of hemosiderin staining, also with interval progression. Carotid U/S - Minimal plaque noted bilaterally. No ultrasound evidence of  significant carotid stenosis.   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 No carotid stenosis. No acute findings on echo. 3) Sciatica - consider rule out w/ imaging  4) Aortic stenosis - audible and confirmed w/ echo  5) HTN - managed w/ clonidine d/t reported allergies to mult HTN medication  6) GERD - continue famotidine    Plan:    1)  Discussed with patient possible addition of gabapentin/pregabalin for mgmt of neuropathic pain, but she doesn't want to add any medications. Outpatient mgmt w/ amitryptyline was successful at 100mg dosing, but dizziness required reduction to 50mg. Discussed therapy plan for Dejerine Roussy syndrome w/ Dr. Brizuela Expose  2)  Consider lumbar MRI for w/u of sciatic pain to distinguish b/t Dejerine Roussy syndrome and lumbar radiculopathy. 3) Order PT consult  4)  W/ negative CVA workup, consider discharge tomorrow.       Signed By: Wero Landaverde    March 7, 2017

## 2017-03-07 NOTE — ED NOTES
TRANSFER - OUT REPORT:    Verbal report given to Jaimie Ordonez on Cherelle De Jesus  being transferred to [facility] for routine progression of care       Report consisted of patients Situation, Background, Assessment and   Recommendations(SBAR). Information from the following report(s) SBAR, ED Summary, [facility] and Recent Results was reviewed with the receiving nurse. Lines:       Opportunity for questions and clarification was provided.       Patient transported with:   Hi Trejo

## 2017-03-08 VITALS
BODY MASS INDEX: 38.5 KG/M2 | HEIGHT: 64 IN | HEART RATE: 162 BPM | RESPIRATION RATE: 18 BRPM | DIASTOLIC BLOOD PRESSURE: 90 MMHG | SYSTOLIC BLOOD PRESSURE: 172 MMHG | TEMPERATURE: 97.9 F | OXYGEN SATURATION: 96 % | WEIGHT: 225.5 LBS

## 2017-03-08 LAB
ALBUMIN SERPL BCP-MCNC: 3.2 G/DL (ref 3.2–4.6)
ALBUMIN/GLOB SERPL: 1 {RATIO} (ref 1.2–3.5)
ALP SERPL-CCNC: 80 U/L (ref 50–136)
ALT SERPL-CCNC: 31 U/L (ref 12–65)
ANION GAP BLD CALC-SCNC: 13 MMOL/L (ref 7–16)
AST SERPL W P-5'-P-CCNC: 28 U/L (ref 15–37)
BILIRUB SERPL-MCNC: 0.4 MG/DL (ref 0.2–1.1)
BUN SERPL-MCNC: 22 MG/DL (ref 8–23)
CALCIUM SERPL-MCNC: 9.2 MG/DL (ref 8.3–10.4)
CHLORIDE SERPL-SCNC: 107 MMOL/L (ref 98–107)
CO2 SERPL-SCNC: 26 MMOL/L (ref 21–32)
CREAT SERPL-MCNC: 0.92 MG/DL (ref 0.6–1)
ERYTHROCYTE [DISTWIDTH] IN BLOOD BY AUTOMATED COUNT: 13 % (ref 11.9–14.6)
EST. AVERAGE GLUCOSE BLD GHB EST-MCNC: 128 MG/DL
GLOBULIN SER CALC-MCNC: 3.3 G/DL (ref 2.3–3.5)
GLUCOSE SERPL-MCNC: 83 MG/DL (ref 65–100)
HBA1C MFR BLD: 6.1 % (ref 4.8–6)
HCT VFR BLD AUTO: 43.6 % (ref 35.8–46.3)
HGB BLD-MCNC: 14.6 G/DL (ref 11.7–15.4)
MCH RBC QN AUTO: 32.3 PG (ref 26.1–32.9)
MCHC RBC AUTO-ENTMCNC: 33.5 G/DL (ref 31.4–35)
MCV RBC AUTO: 96.5 FL (ref 79.6–97.8)
PLATELET # BLD AUTO: 154 K/UL (ref 150–450)
PMV BLD AUTO: 9.6 FL (ref 10.8–14.1)
POTASSIUM SERPL-SCNC: 3.8 MMOL/L (ref 3.5–5.1)
PROT SERPL-MCNC: 6.5 G/DL (ref 6.3–8.2)
RBC # BLD AUTO: 4.52 M/UL (ref 4.05–5.25)
SODIUM SERPL-SCNC: 146 MMOL/L (ref 136–145)
T3FREE SERPL-MCNC: 2.9 PG/ML (ref 2–4.4)
WBC # BLD AUTO: 4.6 K/UL (ref 4.3–11.1)

## 2017-03-08 PROCEDURE — 74011250637 HC RX REV CODE- 250/637: Performed by: INTERNAL MEDICINE

## 2017-03-08 PROCEDURE — 80053 COMPREHEN METABOLIC PANEL: CPT | Performed by: INTERNAL MEDICINE

## 2017-03-08 PROCEDURE — 74011250637 HC RX REV CODE- 250/637: Performed by: FAMILY MEDICINE

## 2017-03-08 PROCEDURE — 36415 COLL VENOUS BLD VENIPUNCTURE: CPT | Performed by: INTERNAL MEDICINE

## 2017-03-08 PROCEDURE — 83036 HEMOGLOBIN GLYCOSYLATED A1C: CPT | Performed by: INTERNAL MEDICINE

## 2017-03-08 PROCEDURE — 74011250636 HC RX REV CODE- 250/636: Performed by: FAMILY MEDICINE

## 2017-03-08 PROCEDURE — 97165 OT EVAL LOW COMPLEX 30 MIN: CPT

## 2017-03-08 PROCEDURE — 85027 COMPLETE CBC AUTOMATED: CPT | Performed by: INTERNAL MEDICINE

## 2017-03-08 RX ORDER — CLONIDINE HYDROCHLORIDE 0.1 MG/1
0.1 TABLET ORAL 2 TIMES DAILY
Qty: 60 TAB | Refills: 1 | Status: SHIPPED | OUTPATIENT
Start: 2017-03-08

## 2017-03-08 RX ORDER — LORAZEPAM 0.5 MG/1
0.5 TABLET ORAL
Qty: 30 TAB | Refills: 0 | Status: SHIPPED | OUTPATIENT
Start: 2017-03-08

## 2017-03-08 RX ORDER — ASPIRIN 325 MG
325 TABLET ORAL DAILY
Qty: 30 TAB | Refills: 0 | Status: SHIPPED | OUTPATIENT
Start: 2017-03-08

## 2017-03-08 RX ORDER — TERAZOSIN 5 MG/1
5 CAPSULE ORAL
Status: DISCONTINUED | OUTPATIENT
Start: 2017-03-08 | End: 2017-03-08 | Stop reason: HOSPADM

## 2017-03-08 RX ORDER — AMITRIPTYLINE HYDROCHLORIDE 50 MG/1
100 TABLET, FILM COATED ORAL
Qty: 60 TAB | Refills: 0 | Status: SHIPPED | OUTPATIENT
Start: 2017-03-08

## 2017-03-08 RX ORDER — TERAZOSIN 5 MG/1
5 CAPSULE ORAL
Qty: 30 CAP | Refills: 1 | Status: SHIPPED | OUTPATIENT
Start: 2017-03-08

## 2017-03-08 RX ADMIN — ENOXAPARIN SODIUM 40 MG: 40 INJECTION SUBCUTANEOUS at 07:31

## 2017-03-08 RX ADMIN — FAMOTIDINE 20 MG: 20 TABLET ORAL at 07:31

## 2017-03-08 RX ADMIN — CLONIDINE HYDROCHLORIDE 0.1 MG: 0.1 TABLET ORAL at 07:31

## 2017-03-08 RX ADMIN — ASPIRIN 325 MG ORAL TABLET 325 MG: 325 PILL ORAL at 07:31

## 2017-03-08 NOTE — PROGRESS NOTES
Patient states she normally takes 100 mg Elavil, but tonight's order is for 50 mg. Dr. Najera Freedom paged to clarify.

## 2017-03-08 NOTE — PROGRESS NOTES
Discharge instructions and prescriptions given and reviewed with pt, verbalizes understanding, medication side effect sheet reviewed with pt, pt discharged home with family.  gets off work at 5 pm and will here to  after that.

## 2017-03-08 NOTE — MED STUDENT NOTES
*ATTENTION:  This note has been created by a medical student for educational purposes only. Please do not refer to the content of this note for clinical decision-making, billing, or other purposes. Please see attending physicians note to obtain clinical information on this patient. *        Progress Note  Patient: Huong Mancia MRN: [id number] SSN: [id number]    YOB: 1950 Age: 75 y.o. Sex: female      Subjective:  Ms. Dawna Harmon is a 66yo  woman admitted 3/6 for increased LLE weakness. She has been exercising 4d/wk doing water therapy at the [facility], but has ceased d/t increased pain. She has a history of thalamic CVA in 2013 with residual L sided UE/LE weakness, but this is a departure from her baseline. Pertinent medical history from her PCP of [address] syndrome per Dr. Su Irizarry as a result of thalamic CVA in 2013. Her LLE strength has improved today and she is resting in her chair comfortably. [address] - Cervical C3-4 2008, cholecystectomy, hysterectomy, lumpectomy, MOHS, L shoulder arthroscopy  PMH - Breast CA L, CVA 2013, DM2, GERD, HLD, HTN, Lower GI bleed 2014  Meds - correct per [place]  Allergies - pioglitazone chest pain/weakness, egg, cefazolin blisters, metformin weakness/chest pain, tape, olmesartan/amlodipine/HCTZ  SH - nonsmoker, no etoh, no illicit drugs    ROS:    Gen:  Denies fevers, chills, weight loss. Respiratory:  Denies dyspnea, cough  CV:  Denies chest pain and palpitations  GI:  Denies nausea, vomiting, diarrhea, melena, hematochezia  :  Denies dysuria, hematuria, and frequency  MSK:  Endorses LLE weakness with numbness and tingling. Endocrine:  Denies polyuria, polydipsia.   Psych:  Endorses anxiety    Physical Exam:    Patient Vitals for the past 24 hrs:   BP Temp Pulse Resp SpO2   03/08/17 1225 172/90 - - - -   03/08/17 1100 189/82 - - - -   03/08/17 0735 (!) 222/104 97.9 °F (36.6 °C) (!) 162 18 96 %   03/08/17 0304 (!) 196/100 97.8 °F (36.6 °C) 66 18 93 %   03/07/17 2322 159/84 97.6 °F (36.4 °C) 71 18 94 %   03/07/17 1906 150/80 98.3 °F (36.8 °C) 72 20 93 %   03/07/17 1733 175/89 98.5 °F (36.9 °C) (!) 104 20 95 %     General:  Obese woman in NAD, AAOx3  CV:  III/VI systolic ejection murmur best heard at LUSB, radiates b/l to carotids. No rubs or gallops. 2+ b/l dorsalis pedis  Lungs:  CTAB. No wheeze, rales or rhonchi. Abdomen:  Soft, nontender, nondistended. +BSx4. No palpable hepatosplenomegaly. MSK:  Strength 5/5 RLE, 4/5 hip flexion LLE (improved today). 2+ b/l patellar reflex. Decreased sensation L4-S1 distribution LLE. Recent Labs      03/08/17   0431  03/07/17   0610  03/06/17 2015   WBC  4.6  5.7  7.3   HGB  14.6  14.5  14.7   HCT  43.6  41.5  41.9   PLT  154  163  243     Recent Labs      03/08/17   0431  03/07/17   0610  03/06/17 2015   NA  146*  139  139   K  3.8  3.8  3.7   CL  107  102  99   CO2  26  29  31   BUN  22  24*  21   CREA  0.92  1.07*  1.22*   MG   --   2.4   --    SGOT  28   --   32   MRI - 3/7/17  1. No evidence of acute infarction. 2. Findings most compatible with moderate chronic small vessel ischemic changes  and remote bilateral thalamic lacunar infarctions. 3. Multifocal areas of hemosiderin staining, also with interval progression. Carotid U/S 3/7/17- Minimal plaque noted bilaterally. No ultrasound evidence of  significant carotid stenosis. 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 No carotid stenosis per carotid U/S. No acute findings on echo.     3) Sciatica - consider rule out w/ imaging on outpatient basis  4) Aortic stenosis - audible and confirmed w/ echo, no therapy required  5) HTN - managed w/ clonidine d/t reported allergies to mult HTN medication; sporadic hypertensive episodes related to pain. 6) GERD - continue famotidine  7) Hypernatremia - d/c NS IVFs    Plan:    1)  Discussed with patient possible addition of gabapentin/pregabalin for mgmt of neuropathic pain, but she doesn't want to add any medications. Outpatient mgmt w/ amitryptyline was successful at 100mg dosing, but dizziness required reduction to 50mg. 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 Dr. Tierra Loera discussed with her to continue her clonidine use on an outpt basis for her HTN and to not continue taking her terazosin unless her home BP readings are routinely elevated. D/C IVFs, maintain hydration w/ PO intake. 2)  Outpatient workup recommended for R sciatica symptoms. 3) PT consult today with improved mobility. She does not desire home health upon discharge. She states she will continue her therapy routine at the [facility]. Consider home health for monitoring her progress as an outpatient. 4)  Discharge today,  will pick her up after work.       Signed By: Tamara Henry    March 8, 2017

## 2017-03-08 NOTE — PROGRESS NOTES
Received bedside report from Alyssa Cerda RN. Pt is resting quietly. No needs or complaints at this time. Will continue to monitor.

## 2017-03-08 NOTE — PROGRESS NOTES
Problem: Self Care Deficits Care Plan (Adult)  Goal: *Acute Goals and Plan of Care (Insert Text)      OCCUPATIONAL THERAPY: Initial Assessment and Discharge 3/8/2017  INPATIENT: Hospital Day: 3  [place] / [place] / Product Type: Medicare /      NAME/AGE/GENDER: Dusty Chapman is a 75 y.o. female        PRIMARY DIAGNOSIS:  Weakness generalized Weakness generalized Weakness generalized        ICD-10: Treatment Diagnosis:        · Difficulty in walking, Not elsewhere classified (R26.2)   Precautions/Allergies:         Actos [pioglitazone]; Ancef [cefazolin]; Egg; Glucophage [metformin]; Influenza virus vaccine, specific; Other medication; and Tribenzor [olmesartan-amlodipin-hcthiazid]       ASSESSMENT:      Ms. Krysta Carlson presents s/p episodes of 2 falls last week Monday/Tuesday without recalling how it happened. She states her symptoms (LLE pain, LLE numbness/weakness) were exactly the same as last time. MRI brain negative. Her BUE are grossly 5/5 in strength and normal range. She was able to don/doff her socks leaning forward sitting edge of bed with some difficulty with the L; however, this is how she usually does it per patient. She completed functional mobility with rolling walker and supervision in room/aguilera and appeared to have the most pain with bed mobility. Expect her functional status to improve as pain decreases. No further need for acute OT services as she is close to her functional baseline in terms of activities of daily living. She does need some assistance for instrumental activities of daily living currently. BP was 189/82 when taken with automatic BP cuff prior to activity. This section established at most recent assessment   PROBLEM LIST (Impairments causing functional limitations):  1. Increased Pain  2. Edema/Girth    INTERVENTIONS PLANNED: (Benefits and precautions of occupational therapy have been discussed with the patient.)  1.  Assessment only TREATMENT PLAN: Frequency/Duration: Follow patient for today's assessment only. Rehabilitation Potential For Stated Goals: N/A      RECOMMENDED REHABILITATION/EQUIPMENT: (at time of discharge pending progress): No need for further acute OT services. OCCUPATIONAL PROFILE AND HISTORY:   History of Present Injury/Illness (Reason for Referral):  Per MD H& P: Patient case was reviewed with the ER provider prior to seeing the patient. Patient is a 75 y.o. female who presents to the ER thinking that she has had a stroke. She has had a prior stroke affecting her left side. She has recovered much strength and states she works out with a  4 days a week for up to 2 hours. In early Feb, she felt weaker and went to [facility], had neg w/u and sent home from ER. She now reports her weakness is worse, She went from cane to walker, now with several falls and can't really walk with walker either. 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 She reports that this is all the same as her last stroke. Past Medical History/Comorbidities:   Ms. Krysta Carlson  has a past medical history of Arthritis; Cancer [facility]) (1-2009); CVA (cerebral vascular accident) ([address] (8/8/2013); CVA (cerebral vascular accident) ([address] (8/8/2013); Diabetes ([address] (2002); Endocrine disease; GERD (gastroesophageal reflux disease); GERD (gastroesophageal reflux disease) (10/20/2010); High cholesterol; Hypertension; Inflammatory polyps of colon with rectal bleeding ([address] (6/6/2014); Lower GI bleeding (1/8/2014); Nausea & vomiting; Radiation therapy complication; Sepsis (10/20/2010); and UTI (lower urinary tract infection) (10/20/2010). She also has no past medical history of Coagulation defects; COPD; DEMENTIA; Difficult intubation; Heart failure ([address] Malignant hyperthermia due to anesthesia; Other ill-defined conditions(799.89);  Pseudocholinesterase deficiency; Psychiatric disorder; Sleep disorder; Unspecified adverse effect of anesthesia; or Unspecified sleep apnea. Ms. Scott Dietz  has a past surgical history that includes other surgical; gyn; cholecystectomy; breast surgery procedure unlisted (); neurological procedure unlisted (2008); orthopaedic; mohs procedure; lymph node dissection; tubal ligation; colonoscopy; and breast lumpectomy. Social History/Living Environment: Lives with  who works. Home Environment: Private residence  # Steps to Enter: 1  One/Two Story Residence: One story  Living Alone: No  Support Systems: Spouse/Significant Other/Partner  Patient Expects to be Discharged to[de-identified] Private residence  Current DME Used/Available at Home: Shower chair, Walker, rolling, Cane, straight  Tub or Shower Type: Shower  Prior Level of Function/Work/Activity:  Drives (but limits when needed per patient). Goes to the pool to exercise. Typically completes ADL with modified independence. Cultural Preferences: Involved with [facility]. [employer]. Number of Personal Factors/Comorbidities that affect the Plan of Care: Expanded review of therapy/medical records (1-2):  MODERATE COMPLEXITY   ASSESSMENT OF OCCUPATIONAL PERFORMANCE[de-identified]   Activities of Daily Living:           Basic ADLs (From Assessment) Complex ADLs (From Assessment)   Basic ADL  Feeding: Supervision  Oral Facial Hygiene/Grooming: Supervision  Bathing: Stand-by assistance  Upper Body Dressing: Supervision  Lower Body Dressing: Stand-by assistance  Toileting: Supervision Instrumental ADL  Meal Preparation: Moderate assistance  Homemaking:  Moderate assistance   Grooming/Bathing/Dressing Activities of Daily Living     Cognitive Retraining  Safety/Judgement: Awareness of environment                     Lower Body Dressing Assistance  Socks: Supervision/set-up  Position Performed: Seated edge of bed  Cues: Don;Doff Bed/Mat Mobility  Rolling: Minimum assistance  Supine to Sit: Minimum assistance  Sit to Stand: Supervision Most Recent Physical Functioning:   Gross Assessment:  AROM: Within functional limits  Strength: Within functional limits               Posture:     Balance:  Sitting: Intact  Standing: With support  Standing - Static: Good  Standing - Dynamic : Fair Bed Mobility:  Rolling: Minimum assistance  Supine to Sit: Minimum assistance  Wheelchair Mobility:     Transfers:  Sit to Stand: Supervision  Stand to Sit: Supervision      Coordination:          Finger to nose to lap 5 times: equal bilaterally with normal speed and no dysmetria/tremors noted. Patient Vitals for the past 6 hrs:       BP BP Patient Position SpO2 Pulse   17 0735 (!) 222/104 - 96 % (!) 162   17 1100 189/82 At rest - -        Mental Status  Neurologic State: Alert  Orientation Level: Oriented to person, Oriented to place, Oriented to situation  Cognition: Follows commands  Perception: Appears intact  Perseveration: No perseveration noted  Safety/Judgement: Awareness of environment                               Physical Skills Involved:  1. Balance  2. Mobility  3. Sensation Cognitive Skills Affected (resulting in the inability to perform in a timely and safe manner):  1. None noted Psychosocial Skills Affected:  1. Routines and Behaviors   Number of elements that affect the Plan of Care: 1-3:  LOW COMPLEXITY   CLINICAL DECISION MAKIN [facility] [address] [id number] AM-PAC 6 Clicks   Basic Mobility Inpatient Short Form  How much help from another person does the patient currently need. .. Total A Lot A Little None   1. Putting on and taking off regular lower body clothing?   [ ] 1   [ ] 2   [X] 3   [ ] 4   2. Bathing (including washing, rinsing, drying)? [ ] 1   [ ] 2   [X] 3   [ ] 4   3. Toileting, which includes using toilet, bedpan or urinal?   [ ] 1   [ ] 2   [X] 3   [ ] 4   4. Putting on and taking off regular upper body clothing?   [ ] 1   [ ] 2   [X] 3   [ ] 4   5. Taking care of personal grooming such as brushing teeth?    [ ] 1   [ ] 2   [X] 3   [ ] 4   6. Eating meals? [ ] 1   [ ] 2   [X] 3   [ ] 4   © 2007, Trustees of [address] [place] [address] [address] [id number], under license to [employer]. All rights reserved    Score:  Initial: 18 Most Recent: X (Date: -- )     Interpretation of Tool:  Represents activities that are increasingly more difficult (i.e. Bed mobility, Transfers, Gait). Score 24 23 22-20 19-15 14-10 9-7 6       Modifier CH CI CJ CK CL CM CN         · Self Care:               - CURRENT STATUS:    CK - 40%-59% impaired, limited or restricted               - GOAL STATUS:           CK - 40%-59% impaired, limited or restricted               - D/C STATUS:                       CK - 40%-59% impaired, limited or restricted  [place] / [place] / Product Type: Medicare /       Medical Necessity:     · Today's assessment only. Reason for Services/Other Comments:  · Today's assessment only. Use of outcome tool(s) and clinical judgement create a POC that gives a: LOW COMPLEXITY             TREATMENT:   (In addition to Assessment/Re-Assessment sessions the following treatments were rendered)      Pre-treatment Symptoms/Complaints:    Pain: Initial:   Pain Intensity 1:  (pain with transitional movement namely supine to sit)  Pain Location 1: Hip, Leg  Pain Orientation 1: Left  Post Session:  same      Assessment/Reassessment only, no treatment provided today     Braces/Orthotics/Lines/Etc:   · IV  · O2 Device: Room air  Treatment/Session Assessment:    · Response to Treatment:  No treatment rendered. · Interdisciplinary Collaboration:  · Occupational Therapist  · Registered Nurse  · After treatment position/precautions:  · Up in chair  · Bed alarm/tab alert on  · Bed/Chair-wheels locked  · Call light within reach  · RN notified  · Compliance with Program/Exercises: Will assess as treatment progresses. · Recommendations/Intent for next treatment session:   \"Next visit will focus on advancements to more challenging activities\".   Total Treatment Duration:  OT Patient Time In/Time Out  Time In: 1049  Time Out: Cheri Regan [address], OTD, OTR/L

## 2017-03-08 NOTE — DISCHARGE SUMMARY
Discharge Summary     Patient: Wendy Rose MRN: [id number]  SSN: [id number]    YOB: 1950  Age: 75 y.o.   Sex: female       Admit Date: 3/6/2017    Discharge Date: 3/8/2017      Admission Diagnoses: Weakness generalized    Discharge Diagnoses:   Problem List as of 3/8/2017  Date Reviewed: 7/3/2014          Codes Class Noted - Resolved    * (Principal)Weakness generalized ICD-10-CM: R53.1  ICD-9-CM: 780.79  3/6/2017 - Present        CKD (chronic kidney disease) stage 2, GFR 60-89 ml/min (Chronic) ICD-10-CM: N18.2  ICD-9-CM: 585.2  6/6/2014 - Present        Reaction, adjustment, with anxious mood ICD-10-CM: F43.22  ICD-9-CM: 309.24  6/6/2014 - Present        Inflammatory polyps of colon with rectal bleeding ([facility] ICD-10-CM: K51.411  ICD-9-CM: 556.4, 569.3  6/6/2014 - Present        Morbid obesity ([facility] (Chronic) ICD-10-CM: E66.01  ICD-9-CM: 278.01  1/8/2014 - Present        Hydradenitis ICD-10-CM: L73.2  ICD-9-CM: 705.83  1/8/2014 - Present        CVA (cerebral vascular accident) [facility]) ICD-10-CM: I63.9  ICD-9-CM: 434.91  8/8/2013 - Present        Ataxia ICD-10-CM: R27.0  ICD-9-CM: 781.3  8/6/2013 - Present        Weakness of left leg ICD-10-CM: M62.81  ICD-9-CM: 729.89  8/6/2013 - Present        HTN (hypertension) (Chronic) ICD-10-CM: I10  ICD-9-CM: 401.9  10/20/2010 - Present        Diabetes mellitus type 2, controlled ([facility] (Chronic) ICD-10-CM: E11.9  ICD-9-CM: 250.00  10/20/2010 - Present        GERD (gastroesophageal reflux disease) (Chronic) ICD-10-CM: K21.9  ICD-9-CM: 530.81  10/20/2010 - Present        Personal history of breast cancer (Chronic) ICD-10-CM: Z85.3  ICD-9-CM: V10.3  10/20/2010 - Present        RESOLVED: Lower GI bleeding ICD-10-CM: K92.2  ICD-9-CM: 578.9  1/8/2014 - 3/6/2017        RESOLVED: Essential hypertension, malignant ICD-10-CM: I10  ICD-9-CM: 401.0  8/6/2013 - 3/6/2017        RESOLVED: Abnormal CXR ICD-10-CM: R93.8  ICD-9-CM: 793.2  10/23/2010 - 10/24/2010 RESOLVED: Sepsis(995.91) ICD-9-CM: 995.91  10/20/2010 - 10/24/2010        RESOLVED: Cellulitis ICD-10-CM: L03.90  ICD-9-CM: 682.9  10/20/2010 - 3/6/2017        RESOLVED: Fever, unspecified ICD-10-CM: R50.9  ICD-9-CM: 780.60  10/20/2010 - 10/23/2010        RESOLVED: Tachycardia ICD-10-CM: R00.0  ICD-9-CM: 785.0  10/20/2010 - 10/23/2010        RESOLVED: Leukocytosis, unspecified ICD-10-CM: N50.674  ICD-9-CM: 288.60  10/20/2010 - 10/23/2010        RESOLVED: UTI (lower urinary tract infection) ICD-10-CM: N39.0  ICD-9-CM: 599.0  10/20/2010 - 10/23/2010               Discharge Condition: [place] Course:     Patient is a 75 y.o. Female with a PMH of thalamic CVA with Dejerine roussy syndrome, obesity, HTN, CKD and multiple medication allergies,  who presented to the ER complaining of worsening left sided weakness. In early Feb, she felt weaker and went to [facility], had neg w/u and sent home from ER. This time in the ER she  Reported  her weakness was worse, She went from cane to walker, now with several falls and could not  really walk with walker either. 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 She reported that this is all the same as her last stroke. She was admitted to the hospital, a brain MRI/MRA reported evidence of her old CVA but no evidence of a new CVA, a cervical spine MRI reported:    1. Mild to moderate central canal stenosis and moderate bilateral neural  foraminal narrowing at C7-T1. 2. Postoperative changes at C5 through C7. A small area of myelomalacia is  present within the cord at the level of C5-6. Her ECHO showed no acute disease and her carotid US was unremarkable. She was able to walk with a walker.  Her BP remained elevated but she reported being allergic to BB, ACEI, ARB II, hydralazine ( however she has received some of those medications in the past with no major side effects ), She was placed on clonidine 0.1 mg po bid and she was using Hytrin at bedtime. She was offered home services but she refused. She was advised to follow up with her PCP within one week. Gen: Denies fevers, chills, weight loss. Respiratory: Denies dyspnea, cough  CV: Denies chest pain and palpitations  GI: Denies nausea, vomiting, diarrhea, melena, hematochezia  : Denies dysuria, hematuria, and frequency  Neurologic : Endorses LLE weakness with numbness and tingling. Endocrine: Denies polyuria, polydipsia.   Psych: Endorses anxiety      Consults: None    Significant Diagnostic Studies: see hospital course     Disposition: home    Discharge Medications:     Current Facility-Administered Medications:     terazosin (HYTRIN) capsule 5 mg, 5 mg, Oral, QHS, Jose Guadalupe Holley MD    sodium chloride (NS) flush 5-10 mL, 5-10 mL, IntraVENous, Q8H, Shiv Malagon MD, 10 mL at 03/07/17 2135    sodium chloride (NS) flush 5-10 mL, 5-10 mL, IntraVENous, PRN, Shiv Malagon MD    ondansetron [facility]) injection 4 mg, 4 mg, IntraVENous, Q6H PRN, Shiv Malagon MD    aspirin (ASPIRIN) tablet 325 mg, 325 mg, Oral, DAILY, Shiv Malagno MD, 325 mg at 03/08/17 0731    acetaminophen (TYLENOL) tablet 650 mg, 650 mg, Oral, Q4H PRN, Shiv Malagon MD    bisacodyl (DULCOLAX) tablet 5 mg, 5 mg, Oral, DAILY PRN, Shiv Malagon MD    famotidine (PEPCID) tablet 20 mg, 20 mg, Oral, Q12H, Shiv Malagon MD, 20 mg at 03/08/17 0731    enoxaparin (LOVENOX) injection 40 mg, 40 mg, SubCUTAneous, Q24H, Shiv Malagon MD, 40 mg at 03/08/17 0731    clopidogrel (PLAVIX) tablet 75 mg, 75 mg, Oral, DAILY, Shiv Malagon MD    dextrose 40% (GLUTOSE) oral gel 1 Tube, 15 g, Oral, PRN, Shiv Malagon MD    glucagon [facility]) injection 1 mg, 1 mg, IntraMUSCular, PRN, Shiv Malagon MD    dextrose (D50W) injection syrg 12.5-25 g, 25-50 mL, IntraVENous, PRN, Shiv Malagon MD    cloNIDine HCl (CATAPRES) tablet 0.1 mg, 0.1 mg, Oral, BID, Vaishali Fernandez MD, 0.1 mg at 03/08/17 4674    amitriptyline (ELAVIL) tablet 100 mg, 100 mg, Oral, QHS, Daphnetheresa Horta DO, 100 mg at 03/07/17 2137    Current Outpatient Prescriptions:     amitriptyline (ELAVIL) 50 mg tablet, Take 2 Tabs by mouth nightly., Disp: 60 Tab, Rfl: 0    aspirin (ASPIRIN) 325 mg tablet, Take 1 Tab by mouth daily. , Disp: 30 Tab, Rfl: 0    cloNIDine HCl (CATAPRES) 0.1 mg tablet, Take 1 Tab by mouth two (2) times a day., Disp: 60 Tab, Rfl: 1    terazosin (HYTRIN) 5 mg capsule, Take 1 Cap by mouth nightly. Start using if BP is not well controlled on clonidine alone, Disp: 30 Cap, Rfl: 1    LORazepam (ATIVAN) 0.5 mg tablet, Take 1 Tab by mouth every eight (8) hours as needed for Anxiety. Max Daily Amount: 1.5 mg., Disp: 30 Tab, Rfl: 0    calcium-cholecalciferol, d3, (CALCIUM 600 + D) 600-125 mg-unit tab, Take  by mouth daily. , Disp: , Rfl:     Omega-3-DHA-EPA-Fish Oil 1,000 (120-180) mg cap, Take  by mouth., Disp: , Rfl:     vitamin c-vitamin e (CRANBERRY CONCENTRATE) cap, Take 84 mg by mouth daily. , Disp: , Rfl:     potassium 99 mg tablet, Take 99 mg by mouth daily. , Disp: , Rfl:     cyanocobalamin, vitamin B-12, (VITAMIN B-12) 5,000 mcg subl, by SubLINGual route daily. , Disp: , Rfl:     biotin 10 mg tab, Take  by mouth daily. , Disp: , Rfl:       Activity: Activity as tolerated  Diet: Cardiac Diet  Wound Care: None needed    Follow-up Appointments   Procedures    FOLLOW UP VISIT Appointment in: One Week PCP     PCP     Standing Status:   Standing     Number of Occurrences:   1     Order Specific Question:   Appointment in     Answer: One Week    FOLLOW UP VISIT Appointment in: Two Weeks neurology     neurology     Standing Status:   Standing     Number of Occurrences:   1     Standing Expiration Date:   3/9/2017     Order Specific Question:   Appointment in     Answer: Two Weeks     Order Specific Question:   Appt Date:      Answer:   3/8/2017     Comments: unknown     Order Specific Question:   Appt Time:     Answer:   2:37 PM     Comments:   unknown     Order Specific Question:   Office Contact:     Answer:   will contact patient for appointment     Order Specific Question:   What provider will pt be seeing:     Answer:   neurology       Signed By: Akbar Bronson MD     March 8, 2017

## 2017-03-08 NOTE — PROGRESS NOTES
Patient Vitals for the past 12 hrs:   Temp Pulse Resp BP SpO2   03/07/17 1906 98.3 °F (36.8 °C) 72 20 150/80 93 %   03/07/17 1733 98.5 °F (36.9 °C) (!) 104 20 175/89 95 %   03/07/17 1200 98.3 °F (36.8 °C) (!) 103 20 164/86 -   03/07/17 0800 97.8 °F (36.6 °C) (!) 162 20 (!) 222/137 94 %     No adverse effects from catapres admin. Pt ambulating alone with walker this shift. Tolerating very well. Pt continues to report L sided weakness and tingling. SR in the 70-80s today per tele. Report given to night shift RN.

## 2019-05-06 ENCOUNTER — HOSPITAL ENCOUNTER (OUTPATIENT)
Dept: LAB | Age: 69
Discharge: HOME | End: 2019-05-06

## 2019-05-06 LAB
ANION GAP SERPL CALC-SCNC: 8 MMOL/L (ref 7–16)
BUN SERPL-MCNC: 20 MG/DL (ref 8–23)
CALCIUM SERPL-MCNC: 10.7 MG/DL (ref 8.3–10.4)
CHLORIDE SERPL-SCNC: 102 MMOL/L (ref 98–107)
CO2 SERPL-SCNC: 26 MMOL/L (ref 21–32)
CREAT SERPL-MCNC: 1.08 MG/DL (ref 0.6–1)
GLUCOSE SERPL-MCNC: 131 MG/DL (ref 65–100)
POTASSIUM SERPL-SCNC: 5.3 MMOL/L (ref 3.5–5.1)
SODIUM SERPL-SCNC: 136 MMOL/L (ref 136–145)

## 2019-05-06 PROCEDURE — 80048 BASIC METABOLIC PNL TOTAL CA: CPT

## 2024-07-08 NOTE — DISCHARGE INSTRUCTIONS
PCP NO LONGER IN OFFICE, ROUTING TO PRIOR CARE TEAM    E-scribe request for [facility]. Please review and e-scribe if applicable.     Last Visit Date:  3/8/24  Next Visit Date:  Visit date not found    Hemoglobin A1C (%)   Date Value   11/16/2023 5.6   01/30/2018 5.3   08/02/2013 5.3             ( goal A1C is < 7)   No components found for: \"LABMICR\"  No components found for: \"LDLCHOLESTEROL\", \"LDLCALC\"    (goal LDL is <100)   AST (U/L)   Date Value   12/15/2023 16     ALT (U/L)   Date Value   12/15/2023 19     BUN (mg/dL)   Date Value   12/15/2023 11     BP Readings from Last 3 Encounters:   03/08/24 130/80   02/28/24 (!) 153/101   12/15/23 (!) 148/89          (goal 120/80)        Patient Active Problem List:     Heart disease     Chronic back pain     Depression     Hyperlipidemia     CAD, multiple vessel     Asthma     Smoking     Need for vaccination     Syncope     Leg pain     Left hip pain     Chronic cholecystitis     Chest pain     Calculus of gallbladder without cholecystitis without obstruction     History of lumbar fusion     Failed back syndrome     Marijuana use     Abnormal weight loss     Allergic rhinitis     Anxiety state     Chronic midline low back pain with sciatica     Chronic tension-type headache, intractable     Hypertension     Sinus tachycardia     Acute respiratory failure (HCC)     COPD (chronic obstructive pulmonary disease) (HCC)     COPD exacerbation (HCC)     Acute bronchitis     Headache     Syncope and collapse     Unstable angina (HCC)     Lung nodule     Polymyalgia rheumatica (HCC)     Current chronic use of systemic steroids      ----JF     Stroke: After Your Visit     Your Care Instructions     You have had a stroke. Risk factors for stroke include being overweight, smoking, and sedentary lifestyle. This means that the blood flow to a part of your brain was blocked for some time, which damages the nerve cells in that part of the brain. The part of your body controlled by that part of your brain may not function properly now. The brain is an amazing organ that can heal itself to some degree. The stroke you had damaged part of your brain, but other parts of your brain may take over in some way for the damaged areas. You have already started this process. Going home may be hard for you and your family. The more you can try to do for yourself, the better. Remember to take each day one at a time. Follow-up care is a key part of your treatment and safety. Be sure to make and go to all appointments, and call your doctor if you are having problems. Its also a good idea to know your test results and keep a list of the medicines you take. How can you care for yourself at home? Enter a stroke rehabilitation (rehab) program, if your doctor recommends it. Physical, speech, and occupational therapies can help you manage bathing, dressing, eating, and other basics of daily living. Eat a heart-healthy diet that is low in cholesterol, saturated fat, and salt. Eat lots of fresh fruits and vegetables and foods high in fiber. Increase your activities slowly. Take short rest breaks when you get tired. Gradually increase the amount you walk. Start out by walking a little more than you did the day before. Do not drive until your doctor says it is okay. It is normal to feel sad or depressed after a stroke. If the blues last, talk to your doctor. If you are having problems with urine leakage, go to the bathroom at regular times, including when you first wake up and at bedtime. Also, limit fluids after dinner.   If you are constipated, drink plenty of fluids, enough so that your urine is light yellow or clear like water. If you have kidney, heart, or liver disease and have to limit fluids, talk with your doctor before you increase the amount of fluids you drink. Set up a regular time for using the toilet. If you continue to have constipation, your doctor may suggest using a bulking agent, such as Metamucil, or a stool softener, laxative, or enema. Medicines  Take your medicines exactly as prescribed. Call your doctor if you think you are having a problem with your medicine. You may be taking several medicines. ACE (angiotensin-converting enzyme) inhibitors, angiotensin II receptor blockers (ARBs), beta-blockers, diuretics (water pills), and calcium channel blockers control your blood pressure. Statins help lower cholesterol. Your doctor may also prescribe medicines for depression, pain, sleep problems, anxiety, or agitation. If your doctor has given you medicine that prevents blood clots, such as warfarin (Coumadin), aspirin combined with extended-release dipyridamole (Aggrenox), clopidogrel (Plavix), or aspirin to prevent another stroke, you should:  Tell your dentist, pharmacist, and other health professionals that you take these medicines. Watch for unusual bruising or bleeding, such as blood in your urine, red or black stools, or bleeding from your nose or gums. Get regular blood tests to check your clotting time if you are taking Coumadin. Wear medical alert jewelry that says you take blood thinners. You can buy this at most drugstores. Do not take any over-the-counter medicines or herbal products without talking to your doctor first.  If you take birth control pills or hormone replacement therapy, talk to your doctor about whether they are right for you. For family members and caregivers  Make the home safe. Set up a room so that your loved one does not have to climb stairs. Be sure the bathroom is on the same floor.  Move throw rugs and furniture that could cause falls, and make sure that the lighting is good. Put grab bars and seats in tubs and showers. Find out what your loved one can do and what he or she needs help with. Try not to do things for your loved one that your loved one can do on his or her own. Help him or her learn and practice new skills. Visit and talk with your loved one often. Try doing activities together that you both enjoy, such as playing cards or board games. Keep in touch with your loved one's friends as much as you can, and encourage them to visit. Take care of yourself. Do not try to do everything yourself. Ask other family members to help. Eat well, get enough rest, and take time to do things that you enjoy. Keep up with your own doctor visits, and make sure to take your medicines regularly. Get out of the house as much as you can. Join a local support group. Find out if you qualify for home health care visits to help with rehab or for adult day care. When should you call for help? Call 911 anytime you think you may need emergency care. For example, call if:  You have signs of another stroke. These may include:  Sudden numbness, paralysis, or weakness in your face, arm, or leg, especially on only one side of your body. New problems with walking or balance. Sudden vision changes. Drooling or slurred speech. New problems speaking or understanding simple statements, or you feel confused. A sudden, severe headache that is different from past headaches. Call 911 even if these symptoms go away in a few minutes. You cough up blood. You vomit blood or what looks like coffee grounds. You pass maroon or very bloody stools. Call your doctor now or seek immediate medical care if:  You have new bruises or blood spots under your skin. You have a nosebleed. Your gums bleed when you brush your teeth. You have blood in your urine. Your stools are black and tarlike or have streaks of blood.   You have vaginal bleeding when you are not having your period, or heavy period bleeding. You have new symptoms that may be related to your stroke, such as falls or trouble swallowing. Watch closely for changes in your health, and be sure to contact your doctor if you have any problems. Where can you learn more? Go to [employer]    Enter C294  in the search box to learn more about \"Stroke: After Your Visit\". © 4120-7835 [employer]. Care instructions adapted under license by [employer] (which disclaims liability or warranty for this information). This care instruction is for use with your licensed healthcare professional. If you have questions about a medical condition or this instruction, always ask your healthcare professional. [employer] any warranty or liability for your use of this information. DISCHARGE SUMMARY from Nurse    The following personal items are in your possession at time of discharge:    Dental Appliances: None  Visual Aid: Glasses     Home Medications: None  Jewelry: Ring  Clothing: Jacket/Coat, Pants, Footwear, Shirt  Other Valuables: Cell Phone             PATIENT INSTRUCTIONS:    After general anesthesia or intravenous sedation, for 24 hours or while taking prescription Narcotics:  · Limit your activities  · Do not drive and operate hazardous machinery  · Do not make important personal or business decisions  · Do  not drink alcoholic beverages  · If you have not urinated within 8 hours after discharge, please contact your surgeon on call.     Report the following to your surgeon:  · Excessive pain, swelling, redness or odor of or around the surgical area  · Temperature over 100.5  · Nausea and vomiting lasting longer than 4 hours or if unable to take medications  · Any signs of decreased circulation or nerve impairment to extremity: change in color, persistent  numbness, tingling, coldness or increase pain  · Any questions        What to do at Home:  Recommended activity: Activity as tolerated, per MD instructions    If you experience any of the following symptoms fever > 101, nausea, vomiting, abdominal pain, chest pain, shortness of breath please follow up with MD.      *  Please give a list of your current medications to your Primary Care Provider. *  Please update this list whenever your medications are discontinued, doses are      changed, or new medications (including over-the-counter products) are added. *  Please carry medication information at all times in case of emergency situations. These are general instructions for a healthy lifestyle:    No smoking/ No tobacco products/ Avoid exposure to second hand smoke    Surgeon General's Warning:  Quitting smoking now greatly reduces serious risk to your health. Obesity, smoking, and sedentary lifestyle greatly increases your risk for illness    A healthy diet, regular physical exercise & weight monitoring are important for maintaining a healthy lifestyle    You may be retaining fluid if you have a history of heart failure or if you experience any of the following symptoms:  Weight gain of 3 pounds or more overnight or 5 pounds in a week, increased swelling in our hands or feet or shortness of breath while lying flat in bed. Please call your doctor as soon as you notice any of these symptoms; do not wait until your next office visit. Recognize signs and symptoms of STROKE:    F-face looks uneven    A-arms unable to move or move unevenly    S-speech slurred or non-existent    T-time-call 911 as soon as signs and symptoms begin-DO NOT go       Back to bed or wait to see if you get better-TIME IS BRAIN. Warning Signs of HEART ATTACK     Call 911 if you have these symptoms:   Chest discomfort. Most heart attacks involve discomfort in the center of the chest that lasts more than a few minutes, or that goes away and comes back.  It can feel like uncomfortable pressure, squeezing, fullness, or pain.  Discomfort in other areas of the upper body. Symptoms can include pain or discomfort in one or both arms, the back, neck, jaw, or stomach.  Shortness of breath with or without chest discomfort.  Other signs may include breaking out in a cold sweat, nausea, or lightheadedness. Don't wait more than five minutes to call 911 - MINUTES MATTER! Fast action can save your life. Calling 911 is almost always the fastest way to get lifesaving treatment. Emergency Medical Services staff can begin treatment when they arrive -- up to an hour sooner than if someone gets to the hospital by car. The discharge information has been reviewed with the patient. The patient verbalized understanding. Discharge medications reviewed with the patient and appropriate educational materials and side effects teaching were provided.